# Patient Record
Sex: FEMALE | Race: BLACK OR AFRICAN AMERICAN | Employment: OTHER | URBAN - METROPOLITAN AREA
[De-identification: names, ages, dates, MRNs, and addresses within clinical notes are randomized per-mention and may not be internally consistent; named-entity substitution may affect disease eponyms.]

---

## 2017-01-30 ENCOUNTER — IP HISTORICAL/CONVERTED ENCOUNTER (OUTPATIENT)
Dept: OTHER | Age: 42
End: 2017-01-30

## 2017-02-20 ENCOUNTER — OP HISTORICAL/CONVERTED ENCOUNTER (OUTPATIENT)
Dept: OTHER | Age: 42
End: 2017-02-20

## 2017-03-07 ENCOUNTER — OP HISTORICAL/CONVERTED ENCOUNTER (OUTPATIENT)
Dept: OTHER | Age: 42
End: 2017-03-07

## 2017-03-08 ENCOUNTER — OP HISTORICAL/CONVERTED ENCOUNTER (OUTPATIENT)
Dept: OTHER | Age: 42
End: 2017-03-08

## 2017-06-14 ENCOUNTER — OP HISTORICAL/CONVERTED ENCOUNTER (OUTPATIENT)
Dept: OTHER | Age: 42
End: 2017-06-14

## 2017-07-18 ENCOUNTER — HOSPITAL ENCOUNTER (OUTPATIENT)
Dept: MRI IMAGING | Age: 42
Discharge: HOME | End: 2017-07-18
Attending: PHYSICAL MEDICINE & REHABILITATION
Payer: MEDICAID

## 2017-07-18 DIAGNOSIS — M54.50 LOW BACK PAIN: ICD-10-CM

## 2017-07-18 PROCEDURE — 72148 MRI LUMBAR SPINE W/O DYE: CPT

## 2017-08-08 ENCOUNTER — HOSPITAL ENCOUNTER (OUTPATIENT)
Dept: MRI IMAGING | Age: 42
Discharge: HOME | End: 2017-08-08
Attending: PHYSICAL MEDICINE & REHABILITATION
Payer: MEDICAID

## 2017-08-08 DIAGNOSIS — M54.6 PAIN IN THORACIC SPINE: ICD-10-CM

## 2017-08-08 PROCEDURE — 72146 MRI CHEST SPINE W/O DYE: CPT

## 2017-08-22 ENCOUNTER — OP HISTORICAL/CONVERTED ENCOUNTER (OUTPATIENT)
Dept: OTHER | Age: 42
End: 2017-08-22

## 2017-08-25 ENCOUNTER — OFFICE VISIT (OUTPATIENT)
Dept: ENDOCRINOLOGY | Age: 42
End: 2017-08-25

## 2017-08-25 VITALS
BODY MASS INDEX: 39.68 KG/M2 | WEIGHT: 293 LBS | HEART RATE: 96 BPM | SYSTOLIC BLOOD PRESSURE: 127 MMHG | TEMPERATURE: 96.3 F | HEIGHT: 72 IN | RESPIRATION RATE: 16 BRPM | OXYGEN SATURATION: 97 % | DIASTOLIC BLOOD PRESSURE: 82 MMHG

## 2017-08-25 DIAGNOSIS — I10 ESSENTIAL HYPERTENSION: ICD-10-CM

## 2017-08-25 DIAGNOSIS — E11.65 UNCONTROLLED TYPE 2 DIABETES MELLITUS WITH HYPERGLYCEMIA, WITHOUT LONG-TERM CURRENT USE OF INSULIN (HCC): Primary | ICD-10-CM

## 2017-08-25 DIAGNOSIS — E66.01 MORBID OBESITY DUE TO EXCESS CALORIES (HCC): ICD-10-CM

## 2017-08-25 DIAGNOSIS — E78.2 MIXED HYPERLIPIDEMIA: ICD-10-CM

## 2017-08-25 RX ORDER — APIXABAN 5 MG/1
5 TABLET, FILM COATED ORAL 2 TIMES DAILY
COMMUNITY
Start: 2017-08-10

## 2017-08-25 RX ORDER — KETOCONAZOLE 20 MG/ML
SHAMPOO TOPICAL AS NEEDED
COMMUNITY
Start: 2017-08-04

## 2017-08-25 RX ORDER — GLIPIZIDE 10 MG/1
TABLET ORAL
Qty: 60 TAB | Refills: 6 | Status: SHIPPED | OUTPATIENT
Start: 2017-08-25 | End: 2019-02-04 | Stop reason: ALTCHOICE

## 2017-08-25 RX ORDER — LOSARTAN POTASSIUM 50 MG/1
50 TABLET ORAL DAILY
COMMUNITY
Start: 2017-08-10

## 2017-08-25 RX ORDER — CITALOPRAM 10 MG/1
10 TABLET ORAL DAILY
COMMUNITY
Start: 2017-06-24 | End: 2019-02-04 | Stop reason: ALTCHOICE

## 2017-08-25 RX ORDER — SIMVASTATIN 20 MG/1
20 TABLET, FILM COATED ORAL
COMMUNITY
Start: 2017-06-24 | End: 2019-02-04 | Stop reason: DRUGHIGH

## 2017-08-25 RX ORDER — TRIAMCINOLONE ACETONIDE 1 MG/G
OINTMENT TOPICAL AS NEEDED
COMMUNITY
Start: 2017-08-04

## 2017-08-25 RX ORDER — GABAPENTIN 300 MG/1
300 CAPSULE ORAL 2 TIMES DAILY
COMMUNITY
Start: 2017-06-24 | End: 2022-05-09

## 2017-08-25 RX ORDER — RANITIDINE 150 MG/1
150 CAPSULE ORAL 2 TIMES DAILY
COMMUNITY
Start: 2017-08-04

## 2017-08-25 RX ORDER — LIRAGLUTIDE 6 MG/ML
1.8 INJECTION SUBCUTANEOUS DAILY
COMMUNITY
Start: 2017-08-04 | End: 2022-05-09 | Stop reason: ALTCHOICE

## 2017-08-25 NOTE — PROGRESS NOTES
Jennifer Barfield is a 46 y.o. female here for   Chief Complaint   Patient presents with    New Patient     DM       Functional glucose monitor and record keeping system? - yes  Eye exam within last year? - 3/2017  Foot exam within last year? - 8/24/17    1. Have you been to the ER, urgent care clinic since your last visit? Hospitalized since your last visit? - n/a    2. Have you seen or consulted any other health care providers outside of the [facility] since your last visit?   Include any pap smears or colon screening.-n/a      No results found for: HBA1C, HGBE8, IZM6AVEJ, [id number]    Wt Readings from Last 3 Encounters:   No data found for Wt     Temp Readings from Last 3 Encounters:   No data found for Temp     BP Readings from Last 3 Encounters:   No data found for BP     Pulse Readings from Last 3 Encounters:   No data found for Pulse

## 2017-08-25 NOTE — PATIENT INSTRUCTIONS
Do not skip meals  Do not eat in between meals    Reduce carbs- pasta, rice, potatoes, bread   Do not drink juices or sodas  Do not eat peanut butter   Do cheese     Do not eat sugar free cookies and cakes   Do not eat peaches, grapes, ornages, pineapples and  Raisins     -------------------------------------------------------------------------------------------------------------------------------------            Start on jentadueto 2.5/1000 mg twice a day with meals       Glipizide  10  Mg twice a day, twenty min before b-fast and dinner   Stop basaglar        Stay on  Victoza  At 1.8 mg a day

## 2017-08-25 NOTE — LETTER
8/26/2017 9:10 PM 
 
Patient: Lenard Keith YOB: 1975 Date of Visit: 8/25/2017 Dear Julius Davis MD 
[address] [place] [address] [place] [facility] [address] VIA Facsimile: [phone] 
 : 
 
 
Thank you for referring Ms. Lenard Keith to me for evaluation/treatment. Below are the relevant portions of my assessment and plan of care. Lenard Keith is a 46 y.o. female here for Chief Complaint Patient presents with  New Patient DM Functional glucose monitor and record keeping system? - yes Eye exam within last year? - 3/2017 Foot exam within last year? - 8/24/17 1. Have you been to the ER, urgent care clinic since your last visit? Hospitalized since your last visit? - n/a 
 
2. Have you seen or consulted any other health care providers outside of the [address] [place] [address] since your last visit? Include any pap smears or colon screening.-n/a No results found for: HBA1C, HGBE8, TGN4VAXA, GAQ4USBD Wt Readings from Last 3 Encounters:  
No data found for [employer] Temp Readings from Last 3 Encounters:  
No data found for Temp BP Readings from Last 3 Encounters:  
No data found for BP Pulse Readings from Last 3 Encounters:  
No data found for Pulse HISTORY OF PRESENT ILLNESS Lenard Keith is a 46 y.o. female. HPI Patient here for initial visit of Type 2 diabetes mellitus . Referred : by self/pcp H/o diabetes for 2 years Current A1C is 11.6 %   From August 2017 and symptoms/problems include polyuria, polydipsia and visual disturbances Current diabetic medications include basaglar and  victoza SHE HAS DVTS - on eliquis  . Current monitoring regimen: home blood tests - 2-3 times daily Home blood sugar records: trend: fluctuating a lot Any episodes of hypoglycemia? no 
 
Weight trend: increasing steadily Prior visit with dietician: no 
Current diet: \"unhealthy\" diet in general 
Current exercise: no regular exercise Known diabetic complications: nephropathy Cardiovascular risk factors: dyslipidemia, diabetes mellitus, obesity, hypertension, stress Eye exam current (within one year): yes MAYANK: yes History reviewed. No pertinent past medical history. History reviewed. No pertinent surgical history. Current Outpatient Prescriptions Medication Sig  ELIQUIS 5 mg tablet 5 mg two (2) times a day.  citalopram (CELEXA) 10 mg tablet Take 10 mg by mouth daily.  gabapentin (NEURONTIN) 300 mg capsule Take 300 mg by mouth two (2) times a day.  ketoconazole (NIZORAL) 2 % shampoo Apply  to affected area as needed.  VICTOZA 3-YOUSUF 0.6 mg/0.1 mL (18 mg/3 mL) pnij 1.8 mg by SubCUTAneous route daily.  triamcinolone acetonide (KENALOG) 0.1 % ointment Apply  to affected area as needed.  simvastatin (ZOCOR) 20 mg tablet Take 20 mg by mouth nightly.  raNITIdine hcl 150 mg capsule 150 mg two (2) times a day.  losartan (COZAAR) 50 mg tablet Take 50 mg by mouth daily. No current facility-administered medications for this visit. Review of Systems Constitutional: Negative. HENT: Negative. Eyes: Negative for pain and redness. Respiratory: Negative. Cardiovascular: Negative for chest pain, palpitations and leg swelling. Gastrointestinal: Negative. Negative for constipation. Genitourinary: Negative. Musculoskeletal: Negative for myalgias. Skin: Negative. Neurological: Negative. Endo/Heme/Allergies: Negative. Psychiatric/Behavioral: Negative for depression and memory loss. The patient does not have insomnia. Physical Exam  
Constitutional: She is oriented to person, place, and time. She appears well-developed and well-nourished. Body mass index is 55.04 kg/(m^2). HENT:  
Head: Normocephalic. Eyes: Conjunctivae and EOM are normal. Pupils are equal, round, and reactive to light. Neck: Normal range of motion. Neck supple. No JVD present. No tracheal deviation present.  No thyromegaly present. Cardiovascular: Normal rate, regular rhythm and normal heart sounds. No murmur heard. Pulmonary/Chest: Breath sounds normal.  
Abdominal: Soft. Bowel sounds are normal.  
Musculoskeletal: Normal range of motion. Lymphadenopathy:  
  She has no cervical adenopathy. Neurological: She is alert and oriented to person, place, and time. She has normal reflexes. Skin: Skin is warm. Psychiatric: She has a normal mood and affect. Reviewed from PCP  
 
 
ASSESSMENT and PLAN 1. Type 2 DM, poorly controlled :  a1c is over 11 % Discussed DM 2 patho-physiology extensively Gave her dietary instructions Start on jentadueto 2.5/1000 mg twice a day with meals Glipizide  10  Mg twice a day, twenty min before b-fast and dinner Stop basaglar Stay on  Victoza  At 1.8 mg a day Patient is advised about checking blood sugars 4 times a day and maintaining log book. The danger of having low blood sugars has been explained with inappropriate use of insulin  Patient voiced understanding and using the printed instructions at home. Hypoglycemia management has been explained to the patient. lab results and schedule of future lab studies reviewed with patient 
specific diabetic recommendations: diabetic diet discussed in detail, written exchange diet given, home glucose monitoring emphasized, home glucose monitoring demonstrated and taught, glucose meter dispensed to patient, all medications, side effects and compliance discussed carefully and use and side effects of insulin is taught 2. Hypoglycemia :  Educated on treating the hypoglycemia. Discussed Glucagon use and prescribed 3. HTN : continue cozaar. Patient is educated about importance of compliance with anti-hypertensives especially ARB/ACEI 4. Dyslipidemia : continue zocorPatient is educated about benefits and adverse effects of statins and explained how benefits outweigh risk.  
 
5. use of aspirin to prevent MI and TIA's discussed 6. Holds a CANE for back pain, ruptured disc, on neurontin 7. Morbidly obese ; Body mass index is 55.04 kg/(m^2). On victoza Diet emphasized 8. On anticoagulants , Elliquis - for multiple DVTs  
 
> 50 % of time is spent on counseling If you have questions, please do not hesitate to call me. I look forward to following Ms. Laura Hair along with you. Sincerely, Claudene Maffucci, MD

## 2017-08-25 NOTE — PROGRESS NOTES
HISTORY OF PRESENT ILLNESS  Sury Hoyt is a 46 y.o. female. HPI  Patient here for initial visit of Type 2 diabetes mellitus . Referred : by self/pcp    H/o diabetes for 2 years     Current A1C is 11.6 %   From August 2017 and symptoms/problems include polyuria, polydipsia and visual disturbances     Current diabetic medications include basaglar and  victoza     SHE HAS DVTS - on eliquis  . Current monitoring regimen: home blood tests - 2-3 times daily  Home blood sugar records: trend: fluctuating a lot  Any episodes of hypoglycemia? no    Weight trend: increasing steadily  Prior visit with dietician: no  Current diet: \"unhealthy\" diet in general  Current exercise: no regular exercise    Known diabetic complications: nephropathy  Cardiovascular risk factors: dyslipidemia, diabetes mellitus, obesity, hypertension, stress    Eye exam current (within one year): yes  MAYANK: yes     History reviewed. No pertinent past medical history. History reviewed. No pertinent surgical history. Current Outpatient Prescriptions   Medication Sig    ELIQUIS 5 mg tablet 5 mg two (2) times a day.  citalopram (CELEXA) 10 mg tablet Take 10 mg by mouth daily.  gabapentin (NEURONTIN) 300 mg capsule Take 300 mg by mouth two (2) times a day.  ketoconazole (NIZORAL) 2 % shampoo Apply  to affected area as needed.  VICTOZA 3-YOUSUF 0.6 mg/0.1 mL (18 mg/3 mL) pnij 1.8 mg by SubCUTAneous route daily.  triamcinolone acetonide (KENALOG) 0.1 % ointment Apply  to affected area as needed.  simvastatin (ZOCOR) 20 mg tablet Take 20 mg by mouth nightly.  raNITIdine hcl 150 mg capsule 150 mg two (2) times a day.  losartan (COZAAR) 50 mg tablet Take 50 mg by mouth daily. No current facility-administered medications for this visit. Review of Systems   Constitutional: Negative. HENT: Negative. Eyes: Negative for pain and redness. Respiratory: Negative.     Cardiovascular: Negative for chest pain, palpitations and leg swelling. Gastrointestinal: Negative. Negative for constipation. Genitourinary: Negative. Musculoskeletal: Negative for myalgias. Skin: Negative. Neurological: Negative. Endo/Heme/Allergies: Negative. Psychiatric/Behavioral: Negative for depression and memory loss. The patient does not have insomnia. Physical Exam   Constitutional: She is oriented to person, place, and time. She appears well-developed and well-nourished. Body mass index is 55.04 kg/(m^2). HENT:   Head: Normocephalic. Eyes: Conjunctivae and EOM are normal. Pupils are equal, round, and reactive to light. Neck: Normal range of motion. Neck supple. No JVD present. No tracheal deviation present. No thyromegaly present. Cardiovascular: Normal rate, regular rhythm and normal heart sounds. No murmur heard. Pulmonary/Chest: Breath sounds normal.   Abdominal: Soft. Bowel sounds are normal.   Musculoskeletal: Normal range of motion. Lymphadenopathy:     She has no cervical adenopathy. Neurological: She is alert and oriented to person, place, and time. She has normal reflexes. Skin: Skin is warm. Psychiatric: She has a normal mood and affect. Reviewed from PCP       ASSESSMENT and PLAN    1. Type 2 DM, poorly controlled :  a1c is over 11 %     Discussed DM 2 patho-physiology extensively   Gave her dietary instructions    Start on jentadueto 2.5/1000 mg twice a day with meals   Glipizide  10  Mg twice a day, twenty min before b-fast and dinner   Stop basaglar      Stay on  Victoza  At 1.8 mg a day     Patient is advised about checking blood sugars 4 times a day and maintaining log book. The danger of having low blood sugars has been explained with inappropriate use of insulin  Patient voiced understanding and using the printed instructions at home. Hypoglycemia management has been explained to the patient.          lab results and schedule of future lab studies reviewed with patient  specific diabetic recommendations: diabetic diet discussed in detail, written exchange diet given, home glucose monitoring emphasized, home glucose monitoring demonstrated and taught, glucose meter dispensed to patient, all medications, side effects and compliance discussed carefully and use and side effects of insulin is taught    2. Hypoglycemia :  Educated on treating the hypoglycemia. Discussed Glucagon use and prescribed    3. HTN : continue cozaar. Patient is educated about importance of compliance with anti-hypertensives especially ARB/ACEI    4. Dyslipidemia : continue zocorPatient is educated about benefits and adverse effects of statins and explained how benefits outweigh risk. 5. use of aspirin to prevent MI and TIA's discussed      6. Holds a CANE for back pain, ruptured disc, on neurontin    7. Morbidly obese ; Body mass index is 55.04 kg/(m^2). On victoza  Diet emphasized    8.  On anticoagulants , Elliquis - for multiple DVTs     > 50 % of time is spent on counseling

## 2017-08-26 PROBLEM — E78.2 MIXED HYPERLIPIDEMIA: Status: ACTIVE | Noted: 2017-08-26

## 2017-08-26 PROBLEM — E66.01 MORBID OBESITY DUE TO EXCESS CALORIES (HCC): Status: ACTIVE | Noted: 2017-08-26

## 2017-08-26 PROBLEM — I10 ESSENTIAL HYPERTENSION: Status: ACTIVE | Noted: 2017-08-26

## 2017-11-03 ENCOUNTER — OP HISTORICAL/CONVERTED ENCOUNTER (OUTPATIENT)
Dept: OTHER | Age: 42
End: 2017-11-03

## 2018-02-15 ENCOUNTER — OP HISTORICAL/CONVERTED ENCOUNTER (OUTPATIENT)
Dept: OTHER | Age: 43
End: 2018-02-15

## 2018-03-13 ENCOUNTER — OP HISTORICAL/CONVERTED ENCOUNTER (OUTPATIENT)
Dept: OTHER | Age: 43
End: 2018-03-13

## 2018-05-09 ENCOUNTER — OP HISTORICAL/CONVERTED ENCOUNTER (OUTPATIENT)
Dept: OTHER | Age: 43
End: 2018-05-09

## 2018-05-23 ENCOUNTER — OP HISTORICAL/CONVERTED ENCOUNTER (OUTPATIENT)
Dept: OTHER | Age: 43
End: 2018-05-23

## 2018-11-12 ENCOUNTER — ED HISTORICAL/CONVERTED ENCOUNTER (OUTPATIENT)
Dept: OTHER | Age: 43
End: 2018-11-12

## 2019-01-21 LAB — HBA1C MFR BLD HPLC: 11.2 %

## 2019-01-22 LAB
CREATININE, EXTERNAL: 0.63
LDL-C, EXTERNAL: 147
MICROALBUMIN UR TEST STR-MCNC: 533.7 MG/DL

## 2019-01-31 ENCOUNTER — OP HISTORICAL/CONVERTED ENCOUNTER (OUTPATIENT)
Dept: OTHER | Age: 44
End: 2019-01-31

## 2019-02-04 ENCOUNTER — OFFICE VISIT (OUTPATIENT)
Dept: ENDOCRINOLOGY | Age: 44
End: 2019-02-04

## 2019-02-04 VITALS
HEART RATE: 91 BPM | HEIGHT: 72 IN | DIASTOLIC BLOOD PRESSURE: 69 MMHG | RESPIRATION RATE: 18 BRPM | SYSTOLIC BLOOD PRESSURE: 119 MMHG | OXYGEN SATURATION: 98 % | WEIGHT: 293 LBS | BODY MASS INDEX: 39.68 KG/M2 | TEMPERATURE: 96.6 F

## 2019-02-04 DIAGNOSIS — E11.65 UNCONTROLLED TYPE 2 DIABETES MELLITUS WITH HYPERGLYCEMIA, WITHOUT LONG-TERM CURRENT USE OF INSULIN (HCC): Primary | ICD-10-CM

## 2019-02-04 DIAGNOSIS — E66.01 CLASS 3 SEVERE OBESITY DUE TO EXCESS CALORIES WITH SERIOUS COMORBIDITY AND BODY MASS INDEX (BMI) OF 50.0 TO 59.9 IN ADULT (HCC): ICD-10-CM

## 2019-02-04 DIAGNOSIS — F32.A DEPRESSION, UNSPECIFIED DEPRESSION TYPE: ICD-10-CM

## 2019-02-04 DIAGNOSIS — E78.2 MIXED HYPERLIPIDEMIA: ICD-10-CM

## 2019-02-04 DIAGNOSIS — I10 ESSENTIAL HYPERTENSION: ICD-10-CM

## 2019-02-04 DIAGNOSIS — E66.01 MORBID OBESITY DUE TO EXCESS CALORIES (HCC): ICD-10-CM

## 2019-02-04 DIAGNOSIS — R80.1 PERSISTENT PROTEINURIA: ICD-10-CM

## 2019-02-04 RX ORDER — INSULIN ASPART 100 [IU]/ML
INJECTION, SOLUTION INTRAVENOUS; SUBCUTANEOUS
Qty: 30 ML | Refills: 6 | Status: SHIPPED | OUTPATIENT
Start: 2019-02-04 | End: 2019-12-18

## 2019-02-04 RX ORDER — INSULIN GLARGINE 100 [IU]/ML
INJECTION, SOLUTION SUBCUTANEOUS
COMMUNITY
Start: 2019-01-30 | End: 2019-02-04 | Stop reason: SDUPTHER

## 2019-02-04 RX ORDER — PANTOPRAZOLE SODIUM 40 MG/1
TABLET, DELAYED RELEASE ORAL
COMMUNITY
Start: 2019-01-29

## 2019-02-04 RX ORDER — FLUOXETINE HYDROCHLORIDE 20 MG/1
20 CAPSULE ORAL DAILY
Qty: 30 CAP | Refills: 3 | Status: SHIPPED | OUTPATIENT
Start: 2019-02-04 | End: 2022-02-25

## 2019-02-04 RX ORDER — INSULIN GLARGINE 100 [IU]/ML
INJECTION, SOLUTION SUBCUTANEOUS
Qty: 15 ML | Refills: 6 | Status: SHIPPED | OUTPATIENT
Start: 2019-02-04 | End: 2019-04-19 | Stop reason: SDUPTHER

## 2019-02-04 RX ORDER — FUROSEMIDE 40 MG/1
TABLET ORAL
COMMUNITY
Start: 2019-01-18 | End: 2022-02-25

## 2019-02-04 RX ORDER — METFORMIN HYDROCHLORIDE 500 MG/1
TABLET, EXTENDED RELEASE ORAL
Qty: 60 TAB | Refills: 6 | Status: SHIPPED | OUTPATIENT
Start: 2019-02-04

## 2019-02-04 NOTE — PROGRESS NOTES
HISTORY OF PRESENT ILLNESS Mitch Moctezuma is a 41 y.o. female. Patient here for  FIRST FOLLOW UP AFTER  initial visit of Type 2 diabetes mellitus   From august 2017 SAW HER ONLY ONE TIME  
AND LOST FOR FOLLOW UP  
 
 
SHE SAYS PCP WAS MANAGING IT  
 
SHE GOT THE METER ( NOT DATED AND TIMED ) SHE SAYS SHE IS EATING SUGARS  - ONLY NOW the sugars are higher per her SHE IN THE INTERIM   BACK   ONTO  INSULIN AGAIN      
SHE WAS CHANGED FROM BASAGLAR TO LANTUS  
 
 
 
 
OLD HISTORY : 
 
 
Referred : by self/pcp H/o diabetes for 2 years Current A1C is 11.6 %   From August 2017 and symptoms/problems include polyuria, polydipsia and visual disturbances Current diabetic medications include basaglar and  victoza SHE HAS DVTS - on eliquis  . Current monitoring regimen: home blood tests - 2-3 times daily Home blood sugar records: trend: fluctuating a lot Any episodes of hypoglycemia? no 
 
Weight trend: increasing steadily Prior visit with dietician: no 
Current diet: \"unhealthy\" diet in general 
Current exercise: no regular exercise Known diabetic complications: nephropathy Cardiovascular risk factors: dyslipidemia, diabetes mellitus, obesity, hypertension, stress Eye exam current (within one year): yes MAYANK: yes Past Medical History:  
Diagnosis Date  Anemia  Anxiety  Benign essential hypertension  Carcinoma of endometrium ([address]  Costochondritis  Degeneration of intervertebral disc of lumbar region  Depression  DJD (degenerative joint disease) of knee  DVT, lower extremity, distal, acute ([address]  Fibroid  GERD (gastroesophageal reflux disease)  Hypercholesteremia  Obstructive sleep apnea  Pulmonary embolism, bilateral ([address]  Type 2 diabetes mellitus ([address]  Vitamin D deficiency Past Surgical History:  
Procedure Laterality Date  HX CHOLECYSTECTOMY  HX TOTAL ABDOMINAL HYSTERECTOMY  04/01/2014  Dr. Theresa Bates  
 Current Outpatient Medications Medication Sig  
 LANTUS SOLOSTAR U-100 INSULIN 100 unit/mL (3 mL) inpn  pantoprazole (PROTONIX) 40 mg tablet  furosemide (LASIX) 40 mg tablet  ELIQUIS 5 mg tablet 5 mg two (2) times a day.  gabapentin (NEURONTIN) 300 mg capsule Take 300 mg by mouth two (2) times a day.  ketoconazole (NIZORAL) 2 % shampoo Apply  to affected area as needed.  VICTOZA 3-YOUSUF 0.6 mg/0.1 mL (18 mg/3 mL) pnij 1.8 mg by SubCUTAneous route daily.  triamcinolone acetonide (KENALOG) 0.1 % ointment Apply  to affected area as needed.  simvastatin (ZOCOR) 20 mg tablet Take 20 mg by mouth nightly.  losartan (COZAAR) 50 mg tablet Take 50 mg by mouth daily.  citalopram (CELEXA) 10 mg tablet Take 10 mg by mouth daily.  raNITIdine hcl 150 mg capsule 150 mg two (2) times a day.  linagliptin-metFORMIN (JENTADUETO) 2.5-1,000 mg per tablet Take 1 Tab by mouth two (2) times daily (with meals).  glipiZIDE (GLUCOTROL) 10 mg tablet Take 1 tab 20 minutes before Breakfast and 1 tab 20 minutes before [employer] No current facility-administered medications for this visit. Review of Systems Constitutional: Negative. HENT: Negative. Eyes: Negative for pain and redness. Respiratory: Negative. Cardiovascular: Negative for chest pain, palpitations and leg swelling. Gastrointestinal: Negative. Negative for constipation. Genitourinary: Negative. Musculoskeletal: Negative for myalgias. Skin: Negative. Neurological: Negative. Endo/Heme/Allergies: Negative. Psychiatric/Behavioral: Negative for depression and memory loss. The patient does not have insomnia. Physical Exam  
Constitutional: She is oriented to person, place, and time. She appears well-developed and well-nourished. Body mass index is 55.04 kg/(m^2). HENT:  
Head: Normocephalic. Eyes: Conjunctivae and EOM are normal. Pupils are equal, round, and reactive to light.   
Neck: Normal range of motion. Neck supple. No JVD present. No tracheal deviation present. No thyromegaly present. Cardiovascular: Normal rate, regular rhythm and normal heart sounds. No murmur heard. Pulmonary/Chest: Breath sounds normal.  
Abdominal: Soft. Bowel sounds are normal.  
Musculoskeletal: Normal range of motion. Lymphadenopathy:  
  She has no cervical adenopathy. Neurological: She is alert and oriented to person, place, and time. She has normal reflexes. Skin: Skin is warm. Psychiatric: She has a normal mood and affect. Diabetic foot exam:  
 
Left Foot: 
 Visual Exam: normal  
 Pulse DP: 2+ (normal) Filament test: normal sensation Right Foot: 
 Visual Exam: normal  
 Pulse DP: 2+ (normal) Filament test: normal sensation Reviewed from PCP   DATED JAN 2019 ASSESSMENT and PLAN 1. Type 2 DM, poorly controlled :  a1c is   STILL  11.2 %       FROM      JAN 2019      COMPARED TO  over 11 %   FROM AUGUST 2017 Has hyperglycemic symptoms NO FOLLOW UP AFTER THE INITIAL VISIT Discussed DM 2 patho-physiology extensively Gave her dietary instructions  Again Off  jentadueto 2.5/1000 mg ALSO  
SHE CANNOT TOLERATE PLAIN METFORMIN Asking her to try metformin er StopPED  basaglar  WITH THE HOPE THAT SHE WILL FOLLOW DIET PROPERLY  At last visit and got her onto glipizide SHE NEVER FOLLOWED UP AND SHE GOT BACK ONTO BASAL INSULIN AT VERY LOW DOSE  
 SHE IS NO LONGER ON GLIPIZIDE 10  Mg , GOT DISCONTINUED IN THE INTERIM  
 
increased the basal insulin Stay on  Victoza  At 1.8 mg a day WILL DO MEAL TIME INSULINS Explained the sliding scale regimen Patient is advised about checking blood sugars 4 times a day and maintaining log book. The danger of having low blood sugars has been explained with inappropriate use of insulin  Patient voiced understanding and using the printed instructions at home.  Hypoglycemia management has been explained to the patient. lab results and schedule of future lab studies reviewed with patient 
specific diabetic recommendations: diabetic diet discussed in detail, written exchange diet given, home glucose monitoring emphasized, home glucose monitoring demonstrated and taught, glucose meter dispensed to patient, all medications, side effects and compliance discussed carefully and use and side effects of insulin is taught 2. Hypoglycemia :  Educated on treating the hypoglycemia. Discussed Glucagon use and prescribed 3. HTN : continue cozaar. Patient is educated about importance of compliance with anti-hypertensives especially ARB/ACEI 4. Dyslipidemia :  ldl     PCP INCREASED TO  zocor 40 MG HS Await new set of labs Patient is educated about benefits and adverse effects of statins and explained how benefits outweigh risk. 5. use of aspirin to prevent MI and TIA's discussed 6. Holds a CANE for back pain, ruptured disc, on neurontin 7. Morbidly obese ; Body mass index is 55.58 kg/m². On victoza Diet emphasized 8. On anticoagulants , Elliquis - for multiple DVTs  
 
9  prOTEINURIA  : 533  FROM JAN 2019 DISCUSSED PROGNOSIS OF IT 
 
 
10 . DEPRESSION  :  START ON FLUOXETINE   
 
 
> 50 % of time is spent on counseling about her self management plans Patient voiced understanding her plan of care

## 2019-02-04 NOTE — PATIENT INSTRUCTIONS
Do not skip meals Do not eat in between meals Reduce carbs- pasta, rice, potatoes, bread Do not drink juices or sodas Do not eat peanut butter Do cheese Do not eat sugar free cookies and cakes Do not eat peaches, grapes, ornages, pineapples and  Raisins  
 
------------------------------------------------------------------------------------------------------------------------------------- 
 
START ON FLUOXETINE  
 
 
 
START ON METFORMIN ER 50O MG  TWICE A DAY WITH MEALS  
 
STAY ON VICTOZA AT 1.8 MG A DAY Check blood sugars immediately before each meal and at bedtime INCREASE  LANTUS  50 UNITS AT NIGHT  
 
START ON NOVOLOG insulin  10 UNITS WITH EACH MEAL Also, add additional nOVOLOG  as follows with meals  If blood sugars are[de-identified] 
 
150-200 mg 2 units 201-250 mg 5 units 251-300 mg 8 units 301-350 mg 11 units 351-400 mg 14 units 401-450 mg 17 units 451-500 mg 20 units Less than 70 mg NO INSULIN

## 2019-02-04 NOTE — LETTER
NOTIFICATION RETURN TO WORK / SCHOOL 
 
2/4/2019 11:36 AM 
 
Ms. Jennifer Barfield [address] [facility] [address] To Whom It May Concern: Jennifer Barfield is currently under the care of [address] [address] Her daughter, Michelle Gomez accompanied her to this visit She will return to work/school on: 02/05/2019. If there are questions or concerns please have the patient contact our office. Sincerely, Melissa Vidal MD

## 2019-02-04 NOTE — PROGRESS NOTES
1. Have you been to the ER, urgent care clinic since your last visit? Hospitalized since your last visit? No 
 
2. Have you seen or consulted any other health care providers outside of the [address] [place] [address] since your last visit? Include any pap smears or colon screening. No  
 
Chief Complaint Patient presents with  Diabetes  
  followup Learning assessment complete Abuse Screening Questionnaire 2/4/2019 Do you ever feel afraid of your partner? Malgorzata Ryder Are you in a relationship with someone who physically or mentally threatens you? Malgorzata Ryder Is it safe for you to go home? Davida Dance Wt Readings from Last 3 Encounters:  
02/04/19 (!) 409 lb 12.8 oz (185.9 kg) 08/25/17 (!) 405 lb 12.8 oz (184.1 kg) Temp Readings from Last 3 Encounters:  
02/04/19 96.6 °F (35.9 °C) (Oral) 08/25/17 96.3 °F (35.7 °C) (Oral) BP Readings from Last 3 Encounters:  
02/04/19 119/69  
08/25/17 127/82 Pulse Readings from Last 3 Encounters:  
02/04/19 91  
08/25/17 96

## 2019-04-19 RX ORDER — INSULIN GLARGINE 100 [IU]/ML
INJECTION, SOLUTION SUBCUTANEOUS
Qty: 15 ML | Refills: 6 | Status: SHIPPED | OUTPATIENT
Start: 2019-04-19 | End: 2020-03-05

## 2019-04-19 NOTE — TELEPHONE ENCOUNTER
Patient says Dr. Angelito Luo increased Lantus from 16 units to 50 units. Patient says new prescription was never called in and now she is out. Please send new prescription to [facility].

## 2020-01-12 RX ORDER — INSULIN ASPART 100 [IU]/ML
INJECTION, SOLUTION INTRAVENOUS; SUBCUTANEOUS
Qty: 30 ML | Refills: 0 | Status: SHIPPED | OUTPATIENT
Start: 2020-01-12 | End: 2022-05-09 | Stop reason: SDUPTHER

## 2020-02-05 ENCOUNTER — OP HISTORICAL/CONVERTED ENCOUNTER (OUTPATIENT)
Dept: OTHER | Age: 45
End: 2020-02-05

## 2020-02-14 ENCOUNTER — OP HISTORICAL/CONVERTED ENCOUNTER (OUTPATIENT)
Dept: OTHER | Age: 45
End: 2020-02-14

## 2020-02-28 ENCOUNTER — OP HISTORICAL/CONVERTED ENCOUNTER (OUTPATIENT)
Dept: OTHER | Age: 45
End: 2020-02-28

## 2020-02-28 LAB — MAMMOGRAPHY, EXTERNAL: NORMAL

## 2020-03-05 ENCOUNTER — OP HISTORICAL/CONVERTED ENCOUNTER (OUTPATIENT)
Dept: OTHER | Age: 45
End: 2020-03-05

## 2020-11-02 ENCOUNTER — TRANSCRIBE ORDER (OUTPATIENT)
Dept: SCHEDULING | Age: 45
End: 2020-11-02

## 2020-11-02 DIAGNOSIS — R51.9 HEAD ACHE: Primary | ICD-10-CM

## 2020-11-03 ENCOUNTER — TRANSCRIBE ORDER (OUTPATIENT)
Dept: SCHEDULING | Age: 45
End: 2020-11-03

## 2020-11-03 DIAGNOSIS — R51.9 HEAD ACHE: Primary | ICD-10-CM

## 2020-11-03 DIAGNOSIS — M54.2 NECK PAIN: ICD-10-CM

## 2020-11-04 ENCOUNTER — TRANSCRIBE ORDER (OUTPATIENT)
Dept: SCHEDULING | Age: 45
End: 2020-11-04

## 2020-11-04 DIAGNOSIS — M54.2 PAIN, NECK: Primary | ICD-10-CM

## 2020-11-05 ENCOUNTER — TRANSCRIBE ORDER (OUTPATIENT)
Dept: SCHEDULING | Age: 45
End: 2020-11-05

## 2020-11-05 DIAGNOSIS — M54.2 NECK PAIN, ACUTE: Primary | ICD-10-CM

## 2020-11-10 ENCOUNTER — HOSPITAL ENCOUNTER (OUTPATIENT)
Dept: CT IMAGING | Age: 45
Discharge: HOME | End: 2020-11-10
Attending: INTERNAL MEDICINE
Payer: MEDICARE

## 2020-11-10 DIAGNOSIS — R51.9 HEAD ACHE: ICD-10-CM

## 2020-11-10 DIAGNOSIS — M54.2 NECK PAIN, ACUTE: ICD-10-CM

## 2020-11-10 PROCEDURE — 72125 CT NECK SPINE W/O DYE: CPT

## 2020-11-10 PROCEDURE — 70450 CT HEAD/BRAIN W/O DYE: CPT

## 2021-01-05 ENCOUNTER — TRANSCRIBE ORDER (OUTPATIENT)
Dept: SCHEDULING | Age: 46
End: 2021-01-05

## 2021-01-05 DIAGNOSIS — M54.2 CERVICALGIA: Primary | ICD-10-CM

## 2021-01-20 ENCOUNTER — HOSPITAL ENCOUNTER (OUTPATIENT)
Dept: MRI IMAGING | Age: 46
Discharge: HOME | End: 2021-01-20
Attending: PHYSICAL MEDICINE & REHABILITATION

## 2021-01-20 DIAGNOSIS — M54.2 CERVICALGIA: ICD-10-CM

## 2021-01-20 NOTE — ROUTINE PROCESS
Patient is severely claustrophobic and unable to attempt open MRI cervical spine. Advised patient to speak with physician for further instruction.

## 2021-01-25 ENCOUNTER — TRANSCRIBE ORDER (OUTPATIENT)
Dept: SCHEDULING | Age: 46
End: 2021-01-25

## 2021-01-25 DIAGNOSIS — M54.2 CERVICALGIA: Primary | ICD-10-CM

## 2021-02-02 ENCOUNTER — TRANSCRIBE ORDER (OUTPATIENT)
Dept: SCHEDULING | Age: 46
End: 2021-02-02

## 2021-02-02 DIAGNOSIS — M54.2 CERVICALGIA: Primary | ICD-10-CM

## 2021-02-26 ENCOUNTER — TRANSCRIBE ORDER (OUTPATIENT)
Dept: SCHEDULING | Age: 46
End: 2021-02-26

## 2021-02-26 DIAGNOSIS — Z12.31 VISIT FOR SCREENING MAMMOGRAM: Primary | ICD-10-CM

## 2021-03-01 ENCOUNTER — TRANSCRIBE ORDER (OUTPATIENT)
Dept: SCHEDULING | Age: 46
End: 2021-03-01

## 2021-03-01 DIAGNOSIS — M54.2 CERVICALGIA: Primary | ICD-10-CM

## 2021-03-11 ENCOUNTER — HOSPITAL ENCOUNTER (OUTPATIENT)
Dept: MRI IMAGING | Age: 46
Discharge: HOME | End: 2021-03-11
Attending: PHYSICAL MEDICINE & REHABILITATION
Payer: MEDICARE

## 2021-03-11 DIAGNOSIS — M54.2 CERVICALGIA: ICD-10-CM

## 2021-03-11 PROCEDURE — 72141 MRI NECK SPINE W/O DYE: CPT

## 2021-10-19 ENCOUNTER — TRANSCRIBE ORDER (OUTPATIENT)
Dept: SCHEDULING | Age: 46
End: 2021-10-19

## 2021-10-19 DIAGNOSIS — Z12.31 VISIT FOR SCREENING MAMMOGRAM: Primary | ICD-10-CM

## 2021-11-15 ENCOUNTER — HOSPITAL ENCOUNTER (OUTPATIENT)
Dept: MAMMOGRAPHY | Age: 46
Discharge: HOME | End: 2021-11-15
Attending: INTERNAL MEDICINE
Payer: MEDICARE

## 2021-11-15 DIAGNOSIS — Z12.31 VISIT FOR SCREENING MAMMOGRAM: ICD-10-CM

## 2021-11-15 PROCEDURE — 77063 BREAST TOMOSYNTHESIS BI: CPT

## 2022-01-24 LAB
CREATININE, EXTERNAL: 0.72
HBA1C MFR BLD HPLC: 12.1 %
LDL-C, EXTERNAL: 223
TOTAL CHOLESTEROL, NCHOLT: 333

## 2022-02-25 ENCOUNTER — OFFICE VISIT (OUTPATIENT)
Dept: ENT CLINIC | Age: 47
End: 2022-02-25
Payer: MEDICARE

## 2022-02-25 VITALS
WEIGHT: 293 LBS | BODY MASS INDEX: 43.4 KG/M2 | SYSTOLIC BLOOD PRESSURE: 130 MMHG | TEMPERATURE: 97.7 F | OXYGEN SATURATION: 98 % | HEART RATE: 68 BPM | HEIGHT: 69 IN | RESPIRATION RATE: 16 BRPM | DIASTOLIC BLOOD PRESSURE: 86 MMHG

## 2022-02-25 DIAGNOSIS — H93.8X3 CLOGGED EAR, BILATERAL: Primary | ICD-10-CM

## 2022-02-25 DIAGNOSIS — J31.0 RHINITIS, UNSPECIFIED TYPE: ICD-10-CM

## 2022-02-25 DIAGNOSIS — H93.13 BILATERAL TINNITUS: ICD-10-CM

## 2022-02-25 PROCEDURE — G8754 DIAS BP LESS 90: HCPCS | Performed by: NURSE PRACTITIONER

## 2022-02-25 PROCEDURE — G8752 SYS BP LESS 140: HCPCS | Performed by: NURSE PRACTITIONER

## 2022-02-25 PROCEDURE — G8419 CALC BMI OUT NRM PARAM NOF/U: HCPCS | Performed by: NURSE PRACTITIONER

## 2022-02-25 PROCEDURE — 99204 OFFICE O/P NEW MOD 45 MIN: CPT | Performed by: NURSE PRACTITIONER

## 2022-02-25 PROCEDURE — G8427 DOCREV CUR MEDS BY ELIG CLIN: HCPCS | Performed by: NURSE PRACTITIONER

## 2022-02-25 PROCEDURE — G8432 DEP SCR NOT DOC, RNG: HCPCS | Performed by: NURSE PRACTITIONER

## 2022-02-25 RX ORDER — FLUTICASONE PROPIONATE 50 MCG
SPRAY, SUSPENSION (ML) NASAL
Qty: 1 EACH | Refills: 3 | Status: SHIPPED | OUTPATIENT
Start: 2022-02-25

## 2022-02-25 RX ORDER — MOMETASONE FUROATE 1 MG/G
OINTMENT TOPICAL DAILY
Qty: 15 G | Refills: 0 | Status: SHIPPED | OUTPATIENT
Start: 2022-02-25

## 2022-02-25 NOTE — PROGRESS NOTES
Visit Vitals  /86 (BP 1 Location: Left upper arm, BP Patient Position: Sitting, BP Cuff Size: Large adult)   Pulse 68   Temp 97.7 °F (36.5 °C) (Temporal)   Resp 16   Ht 5' 9\" (1.753 m)   Wt (!) 409 lb (185.5 kg)   SpO2 98%   BMI 60.40 kg/m²     Chief Complaint   Patient presents with    New Patient     clean ears

## 2022-02-25 NOTE — PROGRESS NOTES
Otolaryngology-Head and Neck Surgery  New Patient Visit     Patient: Konstantin Blanc  YOB: 1975  MRN: [id number]  Date of Service: 2/25/2022    Chief Complaint: Bilateral ears clogged    History of Present Illness: Konstantin Blanc is a 42y.o. year old female who presents today for discussion of      Reports bilateral clogged for 2 months  Worse in left  Denies otalgia, congestion, rhinorrhea, PND  +otorrhea, tinnitus, dizziness, itching   Describes as intermittent buzz  Has used triamcinolone/ketoconazole ointment but does not help    Last hearing test, 4 years ago; normal at Dr. Teresa Castillo  No family HX of hearing loss    Hx ENT surgical HX    Past Medical History:  Past Medical History:   Diagnosis Date    Anemia     Anxiety     Benign essential hypertension     Carcinoma of endometrium ([address]     Costochondritis     Degeneration of intervertebral disc of lumbar region     Depression     DJD (degenerative joint disease) of knee     DVT, lower extremity, distal, acute ([address]     Fibroid     GERD (gastroesophageal reflux disease)     Hypercholesteremia     Obstructive sleep apnea     Pulmonary embolism, bilateral ([address]     Type 2 diabetes mellitus ([address]     Vitamin D deficiency        Past Surgical History:   Past Surgical History:   Procedure Laterality Date    HX CHOLECYSTECTOMY      HX TOTAL ABDOMINAL HYSTERECTOMY  04/01/2014    Dr. MCGOWAN [facility]       Medications:   Current Outpatient Medications   Medication Instructions    Eliquis 5 mg, 2 TIMES DAILY    FLUoxetine (PROZAC) 20 mg, Oral, DAILY    furosemide (LASIX) 40 mg tablet No dose, route, or frequency recorded.     gabapentin (NEURONTIN) 300 mg, Oral, 2 TIMES DAILY    insulin aspart U-100 (NOVOLOG FLEXPEN U-100 INSULIN) 100 unit/mL (3 mL) inpn INJECT 10 UNITS WITH EACH MEAL PLUS SLIDING SCALE TO MAX OF 90 UNITS DAILY    insulin glargine (LANTUS SOLOSTAR U-100 INSULIN) 100 unit/mL (3 mL) inpn INJECT 50 UNITS SUBCUTANEOUSLY AT NIGHT    ketoconazole (NIZORAL) 2 % shampoo Topical, AS NEEDED    losartan (COZAAR) 50 mg, Oral, DAILY    metFORMIN ER (GLUCOPHAGE XR) 500 mg tablet Take one tab twice daily with meals.  pantoprazole (PROTONIX) 40 mg tablet No dose, route, or frequency recorded.  raNITIdine hcl 150 mg, 2 TIMES DAILY    triamcinolone acetonide (KENALOG) 0.1 % ointment Topical, AS NEEDED    Victoza 3-Agustin 1.8 mg, SubCUTAneous, DAILY       Allergies:   No Known Allergies    Social History:   Social History     Tobacco Use    Smoking status: Former Smoker     Packs/day: 0.50     Quit date: 2017     Years since quittin.0    Smokeless tobacco: Never Used   Substance Use Topics    Alcohol use: No    Drug use: Not on file        Family History:  Family History   Problem Relation Age of Onset    Hypertension Maternal Grandmother     Cancer Maternal Grandmother         breast    Cancer Maternal Grandfather         throat    Diabetes Maternal Grandfather        Review of Systems:    Consitutional: denies fever, excessive weight gain or loss. Eyes: denies diplopia, eye pain. Integumentary: denies new concerning skin lesions. Ears, Nose, Mouth, Throat: denies except as per HPI.   Endocrine: denies hot or cold intolerance, increased thirst.  Respiratory: denies cough, hemoptysis, wheezing  Gastrointestinal: denies trouble swallowing, nausea, emesis, regurgitation  Musculoskeletal: denies muscle weakness or wasting  Cardiovascular: denies chest pain, shortness of breath  Neurologic: denies seizures, numbness or tingling, syncope  Hematologic: denies easy bleeding or bruising    Physical Examination:   Vitals:    22 1125   BP: 130/86   BP 1 Location: Left upper arm   BP Patient Position: Sitting   BP Cuff Size: Large adult   Pulse: 68   Temp: 97.7 °F (36.5 °C)   TempSrc: Temporal   Resp: 16   Height: 5' 9\" (1.753 m)   Weight: (!) 409 lb (185.5 kg)   SpO2: 98%        General: Comfortable, pleasant, appears stated age  Voice: Strong, speaking in full sentences, no stridor    Face: No masses or lesions, facial strength symmetric   Ears: External ears unremarkable. Bilateral ear canal clear with notable dryness; eczema at entrance of bilateral canal. Tympanic membrane clear and intact, with visible landmarks. Clear middle ear space  Moon: did not lateralize  Rinne: AC>BC in both ears  Nose: External nose unremarkable. Dorsum midline. Anterior rhinoscopy demonstrates no lesions. Septum midline. Turbinates pale and edematous. Oral Cavity / Oropharynx: No trismus. Mucosa pink and moist. No lesions. Tongue is midline and mobile. Palate elevates symmetrically. Uvula midline. Tonsils unremarkable. Base of tongue soft. Floor of mouth soft. Neck: Supple. No adenopathy. Thyroid unremarkable. Palpable laryngeal landmarks. Full neck range of motion   Neurologic: CN II - XI intact. Normal gait      Assessment and Plan:   1. Bilateral ears clogged   2. Bilateral tinnitus  3. Rhinitis    -Will trial Flonase.  - RX Elocon ointment to apply to bilateral ears for itching.  -Discussed etiologies of tinnitus/ETD. -Will get audio at next visit if symptoms persist.  -Return to office in 6 weeks.         Christiano Suggs MSN, FNP-C  [facility]  [address] [place] [address]  Office Phone: [phone]

## 2022-03-18 PROBLEM — E78.2 MIXED HYPERLIPIDEMIA: Status: ACTIVE | Noted: 2017-08-26

## 2022-03-18 PROBLEM — E66.01 MORBID OBESITY DUE TO EXCESS CALORIES (HCC): Status: ACTIVE | Noted: 2017-08-26

## 2022-03-19 PROBLEM — E11.65 UNCONTROLLED TYPE 2 DIABETES MELLITUS WITH HYPERGLYCEMIA, WITHOUT LONG-TERM CURRENT USE OF INSULIN (HCC): Status: ACTIVE | Noted: 2017-08-25

## 2022-03-20 PROBLEM — I10 ESSENTIAL HYPERTENSION: Status: ACTIVE | Noted: 2017-08-26

## 2022-04-04 ENCOUNTER — OFFICE VISIT (OUTPATIENT)
Dept: ENT CLINIC | Age: 47
End: 2022-04-04
Payer: MEDICARE

## 2022-04-04 VITALS
SYSTOLIC BLOOD PRESSURE: 132 MMHG | HEART RATE: 77 BPM | OXYGEN SATURATION: 99 % | DIASTOLIC BLOOD PRESSURE: 84 MMHG | WEIGHT: 293 LBS | HEIGHT: 69 IN | BODY MASS INDEX: 43.4 KG/M2 | RESPIRATION RATE: 18 BRPM | TEMPERATURE: 98 F

## 2022-04-04 DIAGNOSIS — L29.9 ITCHING OF EAR: ICD-10-CM

## 2022-04-04 DIAGNOSIS — H93.13 BILATERAL TINNITUS: ICD-10-CM

## 2022-04-04 DIAGNOSIS — J31.0 RHINITIS, UNSPECIFIED TYPE: Primary | ICD-10-CM

## 2022-04-04 PROCEDURE — G8427 DOCREV CUR MEDS BY ELIG CLIN: HCPCS | Performed by: NURSE PRACTITIONER

## 2022-04-04 PROCEDURE — G8754 DIAS BP LESS 90: HCPCS | Performed by: NURSE PRACTITIONER

## 2022-04-04 PROCEDURE — G8417 CALC BMI ABV UP PARAM F/U: HCPCS | Performed by: NURSE PRACTITIONER

## 2022-04-04 PROCEDURE — G8432 DEP SCR NOT DOC, RNG: HCPCS | Performed by: NURSE PRACTITIONER

## 2022-04-04 PROCEDURE — G8752 SYS BP LESS 140: HCPCS | Performed by: NURSE PRACTITIONER

## 2022-04-04 PROCEDURE — 99213 OFFICE O/P EST LOW 20 MIN: CPT | Performed by: NURSE PRACTITIONER

## 2022-04-04 NOTE — PROGRESS NOTES
Otolaryngology-Head and Neck Surgery  Follow Up Patient Visit     Patient: Althea Jarvis  YOB: 1975  MRN: [id number]  Date of Service:  4/4/2022    Chief Complaint: Bilateral ears clogged    History of Present Illness: Althea Jarvis is a 42y.o. year old female who was last seen 2/25/22 for bilateral ears clogged. she presents today for follow up.     2/25/22: Reports bilateral clogged for 2 months  Worse in left  Denies otalgia, congestion, rhinorrhea, PND  +otorrhea, tinnitus, dizziness, itching   Describes as intermittent buzz  Has used triamcinolone/ketoconazole ointment but does not help  Last hearing test, 4 years ago; normal at Dr. Bee Post  No family HX of hearing loss    Today reports some itching continues  Worse in left  Been using ointment daily  Flonase not consistent  Denies otalgia, dizziness  Occasional tinnitus      Past Medical History:  Past Medical History:   Diagnosis Date    Anemia     Anxiety     Benign essential hypertension     Carcinoma of endometrium (HCC)     Costochondritis     Degeneration of intervertebral disc of lumbar region     Depression     DJD (degenerative joint disease) of knee     DVT, lower extremity, distal, acute (HCC)     Fibroid     GERD (gastroesophageal reflux disease)     Hypercholesteremia     Obstructive sleep apnea     Pulmonary embolism, bilateral (HCC)     Type 2 diabetes mellitus ([facility]     Vitamin D deficiency        Past Surgical History:   Past Surgical History:   Procedure Laterality Date    HX CHOLECYSTECTOMY      HX TOTAL ABDOMINAL HYSTERECTOMY  04/01/2014    Dr. Liudmila Bradley       Medications:   Current Outpatient Medications   Medication Instructions    Eliquis 5 mg, 2 TIMES DAILY    fluticasone propionate (FLONASE) 50 mcg/actuation nasal spray 2 sprays in each nostril daily.     gabapentin (NEURONTIN) 300 mg, Oral, 2 TIMES DAILY    insulin aspart U-100 (NOVOLOG FLEXPEN U-100 INSULIN) 100 unit/mL (3 mL) inpn INJECT 10 UNITS WITH EACH MEAL PLUS SLIDING SCALE TO MAX OF 90 UNITS DAILY    insulin glargine (LANTUS SOLOSTAR U-100 INSULIN) 100 unit/mL (3 mL) inpn INJECT 50 UNITS SUBCUTANEOUSLY AT NIGHT    ketoconazole (NIZORAL) 2 % shampoo Topical, AS NEEDED    losartan (COZAAR) 50 mg, Oral, DAILY    metFORMIN ER (GLUCOPHAGE XR) 500 mg tablet Take one tab twice daily with meals.  mometasone (ELOCON) 0.1 % ointment Topical, DAILY, Bilateral ears.  pantoprazole (PROTONIX) 40 mg tablet No dose, route, or frequency recorded.  raNITIdine hcl 150 mg, 2 TIMES DAILY    triamcinolone acetonide (KENALOG) 0.1 % ointment Topical, AS NEEDED    Victoza 3-Agustin 1.8 mg, SubCUTAneous, DAILY       Allergies:   No Known Allergies    Social History:   Social History     Tobacco Use    Smoking status: Former Smoker     Packs/day: 0.50     Quit date: 2017     Years since quittin.1    Smokeless tobacco: Never Used   Substance Use Topics    Alcohol use: No    Drug use: Not on file       Family History:  Family History   Problem Relation Age of Onset    Hypertension Maternal Grandmother     Cancer Maternal Grandmother         breast    Cancer Maternal Grandfather         throat    Diabetes Maternal Grandfather        Review of Systems:  Consitutional: denies fever, excessive weight gain or loss. Eyes: denies diplopia, eye pain. Integumentary: denies new concerning skin lesions. Ears, Nose, Mouth, Throat: denies except as per HPI.   Endocrine: denies hot or cold intolerance, increased thirst.  Respiratory: denies cough, hemoptysis, wheezing  Gastrointestinal: denies trouble swallowing, nausea, emesis, regurgitation  Musculoskeletal: denies muscle weakness or wasting  Cardiovascular: denies chest pain, shortness of breath  Neurologic: denies seizures, numbness or tingling, syncope  Hematologic: denies easy bleeding or bruising    Physical Examination:   Vitals:    22 1105   BP: 132/84   BP 1 Location: Left upper arm   BP Patient Position: Sitting   BP Cuff Size: Large adult   Pulse: 77   Temp: 98 °F (36.7 °C)   TempSrc: Temporal   Resp: 18   Height: 5' 9\" (1.753 m)   Weight: (!) 409 lb (185.5 kg)   SpO2: 99%         General: Comfortable, pleasant, appears stated age  Voice: Strong, speaking in full sentences, no stridor    Face: No masses or lesions, facial strength symmetric   Ears: External ears unremarkable. Bilateral ear canal clear with minimal dryness noted. Tympanic membrane clear and intact, with visible landmarks. Clear middle ear space  Nose: External nose unremarkable. Dorsum midline. Anterior rhinoscopy demonstrates no lesions. Septum midline. Turbinates pale and edematous. Oral Cavity / Oropharynx: No trismus. Mucosa pink and moist. No lesions. Tongue is midline and mobile. Palate elevates symmetrically. Uvula midline. Tonsils unremarkable. Base of tongue soft. Floor of mouth soft. Neck: Supple. No adenopathy. Thyroid unremarkable. Palpable laryngeal landmarks. Full neck range of motion   Neurologic: CN II - XI intact. Normal gait      Assessment and Plan:   1. Bilateral tinnitus   2. Rhinitis   3. Ears itching    -Consistent use of Flonase.  -Continue Elocon ointment to ears. Vast improvement noted.   -Nasal saline  -Humidifier  -Tinnitus is only occasional now, would like to hold on audiogram for now.   -Return to office in 3 months              [address] MSN, FNP-C  [facility]  [address]  [facility]  Office Phone: [phone]

## 2022-04-04 NOTE — PROGRESS NOTES
Visit Vitals  /84 (BP 1 Location: Left upper arm, BP Patient Position: Sitting, BP Cuff Size: Large adult)   Pulse 77   Temp 98 °F (36.7 °C) (Temporal)   Resp 18   Ht 5' 9\" (1.753 m)   Wt (!) 409 lb (185.5 kg)   SpO2 99%   BMI 60.40 kg/m²     Chief Complaint   Patient presents with    Follow-up     ears

## 2022-05-09 ENCOUNTER — OFFICE VISIT (OUTPATIENT)
Dept: ENDOCRINOLOGY | Age: 47
End: 2022-05-09
Payer: MEDICARE

## 2022-05-09 VITALS
TEMPERATURE: 97.8 F | HEIGHT: 69 IN | OXYGEN SATURATION: 98 % | HEART RATE: 82 BPM | SYSTOLIC BLOOD PRESSURE: 145 MMHG | WEIGHT: 293 LBS | DIASTOLIC BLOOD PRESSURE: 101 MMHG | BODY MASS INDEX: 43.4 KG/M2

## 2022-05-09 DIAGNOSIS — E66.01 MORBID OBESITY DUE TO EXCESS CALORIES (HCC): ICD-10-CM

## 2022-05-09 DIAGNOSIS — E11.49 TYPE 2 DIABETES MELLITUS WITH OTHER NEUROLOGIC COMPLICATION, WITH LONG-TERM CURRENT USE OF INSULIN (HCC): Primary | ICD-10-CM

## 2022-05-09 DIAGNOSIS — I10 ESSENTIAL HYPERTENSION: ICD-10-CM

## 2022-05-09 DIAGNOSIS — Z79.4 TYPE 2 DIABETES MELLITUS WITH OTHER NEUROLOGIC COMPLICATION, WITH LONG-TERM CURRENT USE OF INSULIN (HCC): Primary | ICD-10-CM

## 2022-05-09 DIAGNOSIS — E78.2 MIXED HYPERLIPIDEMIA: ICD-10-CM

## 2022-05-09 LAB
GLUCOSE POC: 264 MG/DL
HBA1C MFR BLD HPLC: 12.3 %

## 2022-05-09 PROCEDURE — 82962 GLUCOSE BLOOD TEST: CPT | Performed by: INTERNAL MEDICINE

## 2022-05-09 PROCEDURE — 83036 HEMOGLOBIN GLYCOSYLATED A1C: CPT | Performed by: INTERNAL MEDICINE

## 2022-05-09 PROCEDURE — 99204 OFFICE O/P NEW MOD 45 MIN: CPT | Performed by: INTERNAL MEDICINE

## 2022-05-09 PROCEDURE — 3046F HEMOGLOBIN A1C LEVEL >9.0%: CPT | Performed by: INTERNAL MEDICINE

## 2022-05-09 RX ORDER — INSULIN ASPART 100 [IU]/ML
INJECTION, SOLUTION INTRAVENOUS; SUBCUTANEOUS
Qty: 18 ML | Refills: 3 | Status: SHIPPED | OUTPATIENT
Start: 2022-05-09

## 2022-05-09 RX ORDER — LANCETS 33 GAUGE
EACH MISCELLANEOUS
COMMUNITY
Start: 2022-01-31 | End: 2022-05-17 | Stop reason: SDUPTHER

## 2022-05-09 RX ORDER — SIMVASTATIN 40 MG/1
40 TABLET, FILM COATED ORAL DAILY
COMMUNITY
Start: 2022-04-22 | End: 2022-05-09 | Stop reason: ALTCHOICE

## 2022-05-09 RX ORDER — BLOOD SUGAR DIAGNOSTIC
STRIP MISCELLANEOUS
Qty: 100 STRIP | Refills: 5 | Status: SHIPPED | OUTPATIENT
Start: 2022-05-09

## 2022-05-09 RX ORDER — OMEPRAZOLE 40 MG/1
40 CAPSULE, DELAYED RELEASE ORAL DAILY
COMMUNITY
Start: 2022-03-24

## 2022-05-09 RX ORDER — LANCETS 33 GAUGE
EACH MISCELLANEOUS
Qty: 100 EACH | Refills: 5 | Status: SHIPPED | OUTPATIENT
Start: 2022-05-09

## 2022-05-09 RX ORDER — CYCLOBENZAPRINE HCL 10 MG
TABLET ORAL
COMMUNITY
Start: 2022-04-13

## 2022-05-09 RX ORDER — PEN NEEDLE, DIABETIC 30 GX3/16"
NEEDLE, DISPOSABLE MISCELLANEOUS
Qty: 200 EACH | Refills: 3 | Status: SHIPPED | OUTPATIENT
Start: 2022-05-09

## 2022-05-09 RX ORDER — DULAGLUTIDE 0.75 MG/.5ML
0.75 INJECTION, SOLUTION SUBCUTANEOUS
Qty: 2 ML | Refills: 3 | Status: SHIPPED | OUTPATIENT
Start: 2022-05-09 | End: 2022-06-08

## 2022-05-09 RX ORDER — GABAPENTIN 600 MG/1
600 TABLET ORAL 3 TIMES DAILY
COMMUNITY
Start: 2022-04-13

## 2022-05-09 RX ORDER — INSULIN GLARGINE 100 [IU]/ML
INJECTION, SOLUTION SUBCUTANEOUS
Qty: 24 ML | Refills: 3 | Status: SHIPPED | OUTPATIENT
Start: 2022-05-09

## 2022-05-09 RX ORDER — PEN NEEDLE, DIABETIC 32GX 5/32"
NEEDLE, DISPOSABLE MISCELLANEOUS
COMMUNITY
Start: 2022-01-31

## 2022-05-09 RX ORDER — INSULIN PUMP SYRINGE, 3 ML
EACH MISCELLANEOUS
Qty: 1 KIT | Refills: 0 | Status: SHIPPED | OUTPATIENT
Start: 2022-05-09

## 2022-05-09 RX ORDER — ATORVASTATIN CALCIUM 40 MG/1
40 TABLET, FILM COATED ORAL
Qty: 30 TABLET | Refills: 3 | Status: SHIPPED | OUTPATIENT
Start: 2022-05-09 | End: 2022-06-08

## 2022-05-09 NOTE — PROGRESS NOTES
History and Physical    Patient: Wilmar Monte MRN: [id number]  SSN: [id number]    YOB: 1975  Age: 42 y.o. Sex: female      Subjective: Wilmar Monte is a 42 y.o. female with past medical history of hypertension, hyperlipidemia, diabetic peripheral neuropathy, obstructive sleep apnea, PE on chronic anticoagulation is sent to me by primary care physician Dr. Myriam Prajapati for type 2 diabetes mellitus. Patient was diagnosed with diabetes a long time back. Currently taking Lantus 30 units twice a day, NovoLog 18 to 20 units after dinner, sometimes after breakfast as well, Victoza 1.8 mg daily, metformin  mg 1-2 times a day. Patient does not know exactly how to take her medications, that is why she has not been taking it properly. She eats 3 meals per day. Unable to exercise because of other comorbidities. Check blood glucose 3-4 times per day. Did not bring glucometer today. Reporting glucose always runs high. Up-to-date with diabetic eye exam.  For hyperlipidemia she is taking simvastatin 40 mg, sometimes in the morning and sometimes at night. No family history of coronary artery disease. She has obstructive sleep apnea but she does not wear CPAP because it is not comfortable and she is also scared about using it because of recent recalls. Glucometer reading: Did not bring glucometer today    · Diagnosis: long time back  · Current treatment: Lantus 30 units bid, Novolog 18-20 units after dinner, victoza 1.8 mg daily, metformin  mg 1-2 a day  · Past treatment: no  · Glucose checks: 3-4 times a day fastin in 200s, later between 300-400s   · Hyperglycemia: yes  · Hypoglycemia: no  · Meals per day: 3.  Breakfast: eggs, sausage, toast, juice or water, lunch: burger and fries and soda, dinner: vegetable, sweet tea and meat, snacks: candy or cake, soda, sweet tea, juice  · Exercise: no  · DM related hospitalizations: no    Smoking: no, vape  Family history of coronary artery disease/stroke: no     Complications of DM:  · CAD: no  · CVA: no  · PVD: no  · Amputations: no   · Retinopathy: no; last exam was   · Gastropathy: no  · Nephropathy: no  · Neuropathy: yes gabapentin  Sees podiatrist:    Medications:  · Statin: simvastatin 40 mg   · ACE-I: losartan   · ASA: no she is on eliquis for PE    · Diabetes education:  Past Medical History:   Diagnosis Date    Anemia     Anxiety     Benign essential hypertension     Carcinoma of endometrium (HCC)     Costochondritis     Degeneration of intervertebral disc of lumbar region     Depression     DJD (degenerative joint disease) of knee     DVT, lower extremity, distal, acute (HCC)     Fibroid     GERD (gastroesophageal reflux disease)     Hypercholesteremia     Obstructive sleep apnea     Pulmonary embolism, bilateral (HCC)     Type 2 diabetes mellitus ([facility]     Vitamin D deficiency      Past Surgical History:   Procedure Laterality Date    HX CHOLECYSTECTOMY      HX TOTAL ABDOMINAL HYSTERECTOMY  2014    Dr. Rosamaria Fischer      Family History   Problem Relation Age of Onset    Hypertension Maternal Grandmother     Cancer Maternal Grandmother         breast    Cancer Maternal Grandfather         throat    Diabetes Maternal Grandfather      Social History     Tobacco Use    Smoking status: Former Smoker     Packs/day: 0.50     Quit date: 2017     Years since quittin.2    Smokeless tobacco: Never Used   Substance Use Topics    Alcohol use: No      Prior to Admission medications    Medication Sig Start Date End Date Taking? Authorizing Provider   cyclobenzaprine (FLEXERIL) 10 mg tablet TAKE 1 TABLET BY MOUTH THREE TIMES DAILY AS NEEDED 22  Yes Provider, Historical   TRUEplus Lancets 33 gauge misc USE 1 TO CHECK GLUCOSE THREE TIMES DAILY 22  Yes Provider, Historical   omeprazole (PRILOSEC) 40 mg capsule Take 40 mg by mouth daily.  3/24/22  Yes Provider, Historical   BD Gabby 2nd Gen Pen Needle 32 gauge x 5/32\" ndle USE EACH THREE TIMES DAILY 1/31/22  Yes Provider, Historical   gabapentin (NEURONTIN) 600 mg tablet Take 600 mg by mouth three (3) times daily. 4/13/22  Yes Provider, Historical   dulaglutide (Trulicity) 7.47 NM/9.5 mL sub-q pen 0.5 mL by SubCUTAneous route every seven (7) days for 30 days. 5/9/22 6/8/22 Yes Agapito Leyden, MD   insulin glargine (Lantus Solostar U-100 Insulin) 100 unit/mL (3 mL) inpn Inject 40 units twice a day 5/9/22  Yes Agapito Leyden, MD   insulin aspart U-100 (NovoLOG Flexpen U-100 Insulin) 100 unit/mL (3 mL) inpn Inject 20 units before each meal 5/9/22  Yes Agapito Leyden, MD   Insulin Needles, Disposable, 31 gauge x 5/16\" ndle 4 times a day 5/9/22  Yes Agapito Leyden, MD   atorvastatin (LIPITOR) 40 mg tablet Take 1 Tablet by mouth nightly for 30 days. 5/9/22 6/8/22 Yes Agapito Leyden, MD   Blood-Glucose Meter (OneTouch Ultra2 Meter) monitoring kit Check glucose 3 times a day 5/9/22  Yes Agapito Leyden, MD   glucose blood VI test strips (OneTouch Ultra Test) strip Check glucose 3 times a day 5/9/22  Yes Agapito Leyden, MD   lancets (One Touch Delica) 33 gauge misc Check glucose 3 times a day 5/9/22  Yes Agapito Leyden, MD   fluticasone propionate (FLONASE) 50 mcg/actuation nasal spray 2 sprays in each nostril daily. 2/25/22  Yes Estleeann Sober H, NP   mometasone (ELOCON) 0.1 % ointment Apply  to affected area daily. Bilateral ears. 2/25/22  Yes Corrie Thomas NP   pantoprazole (PROTONIX) 40 mg tablet  1/29/19  Yes Provider, Historical   metFORMIN ER (GLUCOPHAGE XR) 500 mg tablet Take one tab twice daily with meals. 2/4/19  Yes Celsa Pandya MD   ELIQUIS 5 mg tablet 5 mg two (2) times a day. 8/10/17  Yes Provider, Historical   ketoconazole (NIZORAL) 2 % shampoo Apply  to affected area as needed. 8/4/17  Yes Provider, Historical   triamcinolone acetonide (KENALOG) 0.1 % ointment Apply  to affected area as needed.  8/4/17  Yes Provider, Historical   raNITIdine hcl 150 mg capsule 150 mg two (2) times a day. 8/4/17  Yes Provider, Historical   losartan (COZAAR) 50 mg tablet Take 50 mg by mouth daily. 8/10/17  Yes Provider, Historical        No Known Allergies    Review of Systems:  ROS    A comprehensive review of systems was preformed and it is negative except heat sensitivity, fatigue, weakness, dry skin, itching, foot sores, neck stiffness, blood clots, headache, dizziness, anxiety, back pain, joint pain    Objective:     Vitals:    05/09/22 1100 05/09/22 1110   BP: (!) 155/97 (!) 145/101   Pulse: 82    Temp: 97.8 °F (36.6 °C)    TempSrc: Temporal    SpO2: 98%    Weight: (!) 393 lb 12.8 oz (178.6 kg)    Height: 5' 9\" (1.753 m)         Physical Exam:    Physical Exam  Vitals and nursing note reviewed. Constitutional:       Appearance: She is obese. HENT:      Head: Normocephalic and atraumatic. Eyes:      Extraocular Movements: Extraocular movements intact. Comments: Mild arcus analysis present   Cardiovascular:      Rate and Rhythm: Normal rate and regular rhythm. Pulmonary:      Effort: Pulmonary effort is normal.      Breath sounds: Normal breath sounds. Musculoskeletal:         General: No swelling. Normal range of motion. Cervical back: Neck supple. Comments: No tendon xanthomas   Skin:     General: Skin is warm. Neurological:      General: No focal deficit present. Mental Status: She is alert and oriented to person, place, and time. Psychiatric:         Mood and Affect: Mood normal.         Behavior: Behavior normal.       diabetic foot exam:  Bilateral diabetic foot exam was performed today. Dorsalis pedis pulses 1+ bilaterally. Monofilament sensation normal bilaterally. No ulcers or skin breakdown.      Labs and Imaging:    Last 3 Recorded Weights in this Encounter    05/09/22 1100   Weight: (!) 393 lb 12.8 oz (178.6 kg)        Lab Results   Component Value Date/Time    Hemoglobin A1c, External 11.2 01/21/2019 12:00 AM        Assessment:     Patient Active Problem List Diagnosis Code    Uncontrolled type 2 diabetes mellitus with hyperglycemia, without long-term current use of insulin ([facility] E11.65    Mixed hyperlipidemia E78.2    Essential hypertension I10    Morbid obesity due to excess calories ([facility]) E66.01    BMI 50.0-59.9, adult ([facility]) Z68.43           Plan:     Type 2 diabetes mellitus, uncontrolled:  I reviewed progress note and labs from the referring provider's office. Hemoglobin A1c was 12.1% on 1-, 12.3% today. Fingerstick blood glucose is 264 mg/dL in my office today. Up to date with diabetes related annual labs: 1-   Up to date with diabetic eye exam: February 2022  Plan:  Discussed with patient importance of controlling diabetes to prevent endorgan damage. Discussed with patient some changes in her eating habits: Discussed about healthy snacking, avoiding all fruit juice, sweet tea and soda. Encourage patient to join fitness center for maybe water exercises as she has arthritis. Increase Lantus to 40 units twice a day. Increase NovoLog to 20 units and take it before each meal.  Switch from Victoza to Trulicity 8.42 mg weekly. Take metformin  mg 1 tablet with dinner only. Check blood glucose 3 times a day every day and I will see her back in 3 months. Essential hypertension:  Patient has microalbuminuria, last checked in January 2022. Blood pressure slightly elevated today. I will reevaluate at next visit. Hyperlipidemia:  1-: Total cholesterol 333, triglycerides 243, . Currently on simvastatin 40 mg and she is not taking it regularly. Stop simvastatin and start atorvastatin 40 mg, take it regularly at bedtime. Discussed about avoiding fried foods, cutting back on beef and pork. It is possible patient has heterozygous familial hypercholesterolemia. I will recheck lipid profile in a few months. Morbid obesity:  Patient qualifies for bariatric surgery. She will think about it. History of PE:   On chronic anticoagulation with Eliquis. Obstructive sleep apnea:  Not using CPAP. Orders Placed This Encounter    REFERRAL TO DIABETIC EDUCATION     Referral Priority:   Routine     Referral Type:   Consultation     Referral Reason:   Specialty Services Required     Number of Visits Requested:   1    AMB POC GLUCOSE BLOOD, BY GLUCOSE MONITORING DEVICE    AMB POC HEMOGLOBIN A1C    dulaglutide (Trulicity) 3.23 KA/6.2 mL sub-q pen     Si.5 mL by SubCUTAneous route every seven (7) days for 30 days. Dispense:  2 mL     Refill:  3     DC victoza    insulin glargine (Lantus Solostar U-100 Insulin) 100 unit/mL (3 mL) inpn     Sig: Inject 40 units twice a day     Dispense:  24 mL     Refill:  3     appt needed for more refills    insulin aspart U-100 (NovoLOG Flexpen U-100 Insulin) 100 unit/mL (3 mL) inpn     Sig: Inject 20 units before each meal     Dispense:  18 mL     Refill:  3    Insulin Needles, Disposable, 31 gauge x 5/16\" ndle     Si times a day     Dispense:  200 Each     Refill:  3    atorvastatin (LIPITOR) 40 mg tablet     Sig: Take 1 Tablet by mouth nightly for 30 days.      Dispense:  30 Tablet     Refill:  3     DC simvastatin    Blood-Glucose Meter (OneTouch Ultra2 Meter) monitoring kit     Sig: Check glucose 3 times a day     Dispense:  1 Kit     Refill:  0    glucose blood VI test strips (OneTouch Ultra Test) strip     Sig: Check glucose 3 times a day     Dispense:  100 Strip     Refill:  5    lancets (One Touch Delica) 33 gauge misc     Sig: Check glucose 3 times a day     Dispense:  100 Each     Refill:  5        Signed By: Frederic Coleman MD     May 9, 2022      Return to clinic

## 2022-05-09 NOTE — LETTER
5/9/2022    Patient: Paulina Herrera   YOB: 1975   Date of Visit: 5/9/2022     Juhi Valentine MD  [facility] [id number]  Via Fax: [phone]    Dear Juhi Valentine MD,      Thank you for referring Ms. Larry Gage to [address] [place] [address] for evaluation. My notes for this consultation are attached. If you have questions, please do not hesitate to call me. I look forward to following your patient along with you.       Sincerely,    Elizabeth Webber MD

## 2022-05-09 NOTE — PATIENT INSTRUCTIONS
Lantus 40 units twice a day (same time everyday)    Novolog 20 units before each meal    Check glucose 3 times a day before meals (and taking Novolog)    Stop Victoza and start Trylicity 0.82 mg week    Take metformin  mg one tab with dinner    Stop Simvastatin and start atorvastatin 40 mg at bedtime

## 2022-05-11 ENCOUNTER — TELEPHONE (OUTPATIENT)
Dept: ENDOCRINOLOGY | Age: 47
End: 2022-05-11

## 2022-05-12 NOTE — TELEPHONE ENCOUNTER
LVM to inform pt that Dr. Larissa Morales sent Audrey injing to [facility] in [facility] on 5/9/2022

## 2022-05-17 ENCOUNTER — TELEPHONE (OUTPATIENT)
Dept: ENDOCRINOLOGY | Age: 47
End: 2022-05-17

## 2022-05-17 DIAGNOSIS — E11.49 TYPE 2 DIABETES MELLITUS WITH OTHER NEUROLOGIC COMPLICATION, WITH LONG-TERM CURRENT USE OF INSULIN (HCC): Primary | ICD-10-CM

## 2022-05-17 DIAGNOSIS — Z79.4 TYPE 2 DIABETES MELLITUS WITH OTHER NEUROLOGIC COMPLICATION, WITH LONG-TERM CURRENT USE OF INSULIN (HCC): Primary | ICD-10-CM

## 2022-05-17 RX ORDER — LANCETS 33 GAUGE
EACH MISCELLANEOUS
Qty: 300 EACH | Refills: 3 | Status: SHIPPED | OUTPATIENT
Start: 2022-05-17

## 2022-05-17 RX ORDER — CALCIUM CITRATE/VITAMIN D3 200MG-6.25
TABLET ORAL
Qty: 300 STRIP | Refills: 3 | Status: SHIPPED | OUTPATIENT
Start: 2022-05-17

## 2022-05-17 NOTE — TELEPHONE ENCOUNTER
Patient is calling to inform that the test strips that were ordered were incorrect as she has a Matrix meter not one touch ultra meter. Can you order her test strips for her old meter and please give her call?  She is completely out of test strips

## 2022-05-20 LAB — CREATININE, EXTERNAL: 0.73

## 2022-05-23 ENCOUNTER — TRANSCRIBE ORDER (OUTPATIENT)
Dept: SCHEDULING | Age: 47
End: 2022-05-23

## 2022-05-23 DIAGNOSIS — R92.8 ABNORMAL MAMMOGRAM: Primary | ICD-10-CM

## 2022-11-18 DIAGNOSIS — E11.65 UNCONTROLLED TYPE 2 DIABETES MELLITUS WITH HYPERGLYCEMIA, WITHOUT LONG-TERM CURRENT USE OF INSULIN (HCC): ICD-10-CM

## 2022-11-19 RX ORDER — LOSARTAN POTASSIUM 50 MG/1
TABLET ORAL
Qty: 30 TABLET | Refills: 0 | Status: SHIPPED | OUTPATIENT
Start: 2022-11-19

## 2022-12-14 DIAGNOSIS — E11.49 TYPE 2 DIABETES MELLITUS WITH OTHER NEUROLOGIC COMPLICATION, WITH LONG-TERM CURRENT USE OF INSULIN (HCC): ICD-10-CM

## 2022-12-14 DIAGNOSIS — Z79.4 TYPE 2 DIABETES MELLITUS WITH OTHER NEUROLOGIC COMPLICATION, WITH LONG-TERM CURRENT USE OF INSULIN (HCC): ICD-10-CM

## 2022-12-14 NOTE — TELEPHONE ENCOUNTER
Requested Prescriptions     Pending Prescriptions Disp Refills    insulin glargine (Lantus Solostar U-100 Insulin) 100 unit/mL (3 mL) inpn 24 mL 3     Sig: Inject 40 units twice a day    insulin aspart U-100 (NovoLOG Flexpen U-100 Insulin) 100 unit/mL (3 mL) inpn 18 mL 3     Sig: Inject 20 units before each meal     Patients endo has left practice and patient is needing refills.

## 2022-12-15 RX ORDER — INSULIN ASPART 100 [IU]/ML
INJECTION, SOLUTION INTRAVENOUS; SUBCUTANEOUS
Qty: 18 ML | Refills: 3 | Status: SHIPPED | OUTPATIENT
Start: 2022-12-15

## 2022-12-15 RX ORDER — INSULIN GLARGINE 100 [IU]/ML
INJECTION, SOLUTION SUBCUTANEOUS
Qty: 24 ML | Refills: 0 | Status: SHIPPED | OUTPATIENT
Start: 2022-12-15

## 2022-12-28 DIAGNOSIS — E11.65 UNCONTROLLED TYPE 2 DIABETES MELLITUS WITH HYPERGLYCEMIA, WITHOUT LONG-TERM CURRENT USE OF INSULIN (HCC): ICD-10-CM

## 2022-12-28 RX ORDER — CYCLOBENZAPRINE HCL 10 MG
TABLET ORAL
Qty: 90 TABLET | Refills: 0 | Status: SHIPPED | OUTPATIENT
Start: 2022-12-28

## 2022-12-28 RX ORDER — METFORMIN HYDROCHLORIDE 500 MG/1
TABLET, EXTENDED RELEASE ORAL
Qty: 120 TABLET | Refills: 0 | Status: SHIPPED | OUTPATIENT
Start: 2022-12-28

## 2022-12-28 RX ORDER — LOSARTAN POTASSIUM 50 MG/1
TABLET ORAL
Qty: 30 TABLET | Refills: 0 | Status: SHIPPED | OUTPATIENT
Start: 2022-12-28

## 2023-01-07 VITALS
WEIGHT: 293 LBS | HEIGHT: 72 IN | SYSTOLIC BLOOD PRESSURE: 126 MMHG | BODY MASS INDEX: 39.68 KG/M2 | DIASTOLIC BLOOD PRESSURE: 87 MMHG | RESPIRATION RATE: 16 BRPM

## 2023-01-07 PROBLEM — G57.12 MERALGIA PARAESTHETICA, LEFT: Status: ACTIVE | Noted: 2023-01-07

## 2023-01-07 PROBLEM — C54.1 ENDOMETRIAL CANCER (HCC): Status: ACTIVE | Noted: 2023-01-07

## 2023-01-07 PROBLEM — M16.0 PRIMARY OSTEOARTHRITIS OF BOTH HIPS: Status: ACTIVE | Noted: 2023-01-07

## 2023-01-07 PROBLEM — G89.4 CHRONIC PAIN DISORDER: Status: ACTIVE | Noted: 2023-01-07

## 2023-01-07 PROBLEM — I26.99 PULMONARY EMBOLISM, BILATERAL (HCC): Status: ACTIVE | Noted: 2023-01-07

## 2023-01-07 PROBLEM — E78.00 PURE HYPERCHOLESTEROLEMIA: Status: ACTIVE | Noted: 2023-01-07

## 2023-01-07 RX ORDER — ESCITALOPRAM OXALATE 10 MG/1
10 TABLET ORAL DAILY
COMMUNITY

## 2023-01-07 RX ORDER — FUROSEMIDE 40 MG/1
40 TABLET ORAL
COMMUNITY

## 2023-02-14 DIAGNOSIS — I10 BENIGN ESSENTIAL HYPERTENSION: Primary | ICD-10-CM

## 2023-02-27 DIAGNOSIS — E11.65 UNCONTROLLED TYPE 2 DIABETES MELLITUS WITH HYPERGLYCEMIA, WITHOUT LONG-TERM CURRENT USE OF INSULIN (HCC): Primary | ICD-10-CM

## 2023-02-27 DIAGNOSIS — Z11.59 ENCOUNTER FOR HEPATITIS C SCREENING TEST FOR LOW RISK PATIENT: ICD-10-CM

## 2023-03-01 LAB
CREATININE, EXTERNAL: 0.66
HBA1C MFR BLD HPLC: 12.8 %
LDL-C, EXTERNAL: 252
TOTAL CHOLESTEROL, NCHOLT: 371

## 2023-03-02 LAB
ALBUMIN SERPL-MCNC: 4.8 G/DL (ref 3.8–4.8)
ALBUMIN/CREAT UR: 1141 MG/G CREAT (ref 0–29)
ALBUMIN/GLOB SERPL: 1.2 {RATIO} (ref 1.2–2.2)
ALP SERPL-CCNC: 118 IU/L (ref 44–121)
ALT SERPL-CCNC: 24 IU/L (ref 0–32)
AST SERPL-CCNC: 25 IU/L (ref 0–40)
BILIRUB SERPL-MCNC: 0.4 MG/DL (ref 0–1.2)
BUN SERPL-MCNC: 10 MG/DL (ref 6–24)
BUN/CREAT SERPL: 15 (ref 9–23)
CALCIUM SERPL-MCNC: 9.8 MG/DL (ref 8.7–10.2)
CHLORIDE SERPL-SCNC: 94 MMOL/L (ref 96–106)
CHOLEST SERPL-MCNC: 371 MG/DL (ref 100–199)
CO2 SERPL-SCNC: 22 MMOL/L (ref 20–29)
CREAT SERPL-MCNC: 0.66 MG/DL (ref 0.57–1)
CREAT UR-MCNC: 23.4 MG/DL
EGFRCR SERPLBLD CKD-EPI 2021: 110 ML/MIN/1.73
EST. AVERAGE GLUCOSE BLD GHB EST-MCNC: 321 MG/DL
GLOBULIN SER CALC-MCNC: 3.9 G/DL (ref 1.5–4.5)
GLUCOSE SERPL-MCNC: 342 MG/DL (ref 70–99)
HBA1C MFR BLD: 12.8 % (ref 4.8–5.6)
HCV IGG SERPL QL IA: NON REACTIVE
HDLC SERPL-MCNC: 62 MG/DL
LABORATORY COMMENT REPORT: ABNORMAL
LDLC SERPL CALC-MCNC: 252 MG/DL (ref 0–99)
MICROALBUMIN UR-MCNC: 267 UG/ML
POTASSIUM SERPL-SCNC: 3.7 MMOL/L (ref 3.5–5.2)
PROT SERPL-MCNC: 8.7 G/DL (ref 6–8.5)
SODIUM SERPL-SCNC: 138 MMOL/L (ref 134–144)
TRIGL SERPL-MCNC: 269 MG/DL (ref 0–149)
VLDLC SERPL CALC-MCNC: 57 MG/DL (ref 5–40)

## 2023-03-03 ENCOUNTER — OFFICE VISIT (OUTPATIENT)
Dept: INTERNAL MEDICINE CLINIC | Age: 48
End: 2023-03-03

## 2023-03-03 VITALS
OXYGEN SATURATION: 98 % | HEIGHT: 69 IN | WEIGHT: 293 LBS | SYSTOLIC BLOOD PRESSURE: 128 MMHG | DIASTOLIC BLOOD PRESSURE: 88 MMHG | HEART RATE: 98 BPM | BODY MASS INDEX: 43.4 KG/M2 | TEMPERATURE: 97 F

## 2023-03-03 DIAGNOSIS — I10 ESSENTIAL HYPERTENSION: ICD-10-CM

## 2023-03-03 DIAGNOSIS — F32.A ANXIETY AND DEPRESSION: ICD-10-CM

## 2023-03-03 DIAGNOSIS — Z91.199 NON COMPLIANCE WITH MEDICAL TREATMENT: ICD-10-CM

## 2023-03-03 DIAGNOSIS — E78.2 MIXED HYPERLIPIDEMIA: ICD-10-CM

## 2023-03-03 DIAGNOSIS — Z12.11 COLON CANCER SCREENING: ICD-10-CM

## 2023-03-03 DIAGNOSIS — E11.65 UNCONTROLLED DIABETES MELLITUS WITH HYPERGLYCEMIA, WITH LONG-TERM CURRENT USE OF INSULIN (HCC): Primary | ICD-10-CM

## 2023-03-03 DIAGNOSIS — Q84.5 ENLARGED AND HYPERTROPHIC NAILS: ICD-10-CM

## 2023-03-03 DIAGNOSIS — E11.9 ENCOUNTER FOR DIABETIC FOOT EXAM (HCC): ICD-10-CM

## 2023-03-03 DIAGNOSIS — N89.8 VAGINAL DISCHARGE: ICD-10-CM

## 2023-03-03 DIAGNOSIS — F41.9 ANXIETY AND DEPRESSION: ICD-10-CM

## 2023-03-03 DIAGNOSIS — I26.99 PULMONARY EMBOLISM, BILATERAL (HCC): ICD-10-CM

## 2023-03-03 DIAGNOSIS — Z79.4 UNCONTROLLED DIABETES MELLITUS WITH HYPERGLYCEMIA, WITH LONG-TERM CURRENT USE OF INSULIN (HCC): Primary | ICD-10-CM

## 2023-03-03 RX ORDER — APIXABAN 5 MG/1
5 TABLET, FILM COATED ORAL 2 TIMES DAILY
Qty: 60 TABLET | Refills: 0 | Status: SHIPPED | OUTPATIENT
Start: 2023-03-03

## 2023-03-03 RX ORDER — TIRZEPATIDE 2.5 MG/.5ML
2.5 INJECTION, SOLUTION SUBCUTANEOUS
Qty: 4 PEN | Refills: 3 | Status: SHIPPED | OUTPATIENT
Start: 2023-03-03

## 2023-03-03 RX ORDER — INSULIN GLARGINE 100 [IU]/ML
INJECTION, SOLUTION SUBCUTANEOUS
Qty: 24 ML | Refills: 0 | Status: SHIPPED | OUTPATIENT
Start: 2023-03-03

## 2023-03-03 RX ORDER — FLUCONAZOLE 150 MG/1
150 TABLET ORAL DAILY
Qty: 1 TABLET | Refills: 0 | Status: SHIPPED | OUTPATIENT
Start: 2023-03-03 | End: 2023-03-04

## 2023-03-03 RX ORDER — ROSUVASTATIN CALCIUM 10 MG/1
10 TABLET, COATED ORAL
Qty: 90 TABLET | Refills: 1 | Status: SHIPPED | OUTPATIENT
Start: 2023-03-03

## 2023-03-03 RX ORDER — KETOCONAZOLE 20 MG/G
2 CREAM TOPICAL DAILY
COMMUNITY
End: 2023-03-03

## 2023-03-03 NOTE — PROGRESS NOTES
[address] [facility] Medicine  [address]  [place], [address] [facility]  Phone: [phone]      Shandra Yung (: 1975) is a 41 y.o. female, established patient, here for evaluation of the following chief complaint(s):  Labs, Follow Up Chronic Condition, and Joint Pain         SUBJECTIVE/OBJECTIVE:  HPI:  Patient is here for follow up, she recently had blood work. Her last office visit was in . She checks her blood sugar 4 times daily; some of her blood sugar ranges 116-486. She denies any episodes of low blood sugar and she needs to schedule her annual eye exam for . She stated she used to see Dr. Omar Boateng but has not seen any endocrinologist since Dr. Omar Boateng left. Patient stated Dr. Omar Boateng changed her Victoza to a weekly injection but she got it. She states that she is still having a lot of joint pain, she has not been to Ortho in awhile. She states that she is not taking gabapentin as it did not help her she states that she is in pain everyday all day, and it is a 10 out of 10. She needs refills on all of her medicines. Patient stated that she had not seen Yamil Zheng for a while and appointment is at end of this month so she needs a refill for her blood thinner also. 23 Glu 342, Creat 0.66, K+ 3.7, Prot T 8.7, Chol T 371, Trig 269, HDL 62, VLDL 57, , A1c 12.8, Alb/Creat pending, HCV Neg.  Prior to Admission medications    Medication Sig Start Date End Date Taking? Authorizing Provider   diphth,pertus,acell,,tetanus (BOOSTRIX TDAP) 2.5-8-5 Lf-mcg-Lf/0.5mL susp suspension 0.5 mL by IntraMUSCular route once for 1 dose. Indications: vaccination to prevent diphtheria, pertussis and tetanus 3/3/23 3/3/23 Yes Stewart Merino MD   rosuvastatin (CRESTOR) 10 mg tablet Take 1 Tablet by mouth nightly. 3/3/23  Yes Stewart Merino MD   tirzepatide [facility]) 2.5 mg/0.5 mL pnij 2.5 mg by SubCUTAneous route every seven (7) days.  3/3/23  Yes Lesli Ng, MD Aundrea   Eliquis 5 mg tablet Take 1 Tablet by mouth two (2) times a day. 3/3/23  Yes Norma Antonio MD   insulin glargine (Lantus Solostar U-100 Insulin) 100 unit/mL (3 mL) inpn Inject 40 units twice a day 3/3/23  Yes Norma Antonio MD   furosemide (LASIX) 40 mg tablet Take 40 mg by mouth daily as needed. Yes Provider, Historical   glucose blood VI test strips (True Metrix Glucose Test Strip) strip Check glucose 3 times a day, 90 days 5/17/22  Yes Renan Manzano MD   omeprazole (PRILOSEC) 40 mg capsule Take 40 mg by mouth daily. 3/24/22  Yes Provider, Historical   BD Gabby 2nd Gen Pen Needle 32 gauge x 5/32\" ndle USE EACH THREE TIMES DAILY 1/31/22  Yes Provider, Historical   Blood-Glucose Meter (OneTouch Ultra2 Meter) monitoring kit Check glucose 3 times a day 5/9/22  Yes Renan Manzano MD   glucose blood VI test strips (OneTouch Ultra Test) strip Check glucose 3 times a day 5/9/22  Yes Renan Manzano MD   lancets (One Touch Delica) 33 gauge misc Check glucose 3 times a day 5/9/22  Yes Renan Manzano MD   fluticasone propionate (FLONASE) 50 mcg/actuation nasal spray 2 sprays in each nostril daily. 2/25/22  Yes Anjum Dejesus NP   ketoconazole (NIZORAL) 2 % shampoo Apply  to affected area as needed. 8/4/17  Yes Provider, Historical   triamcinolone acetonide (KENALOG) 0.1 % ointment Apply  to affected area as needed. 8/4/17  Yes Provider, Historical   raNITIdine hcl 150 mg capsule 150 mg two (2) times a day. 8/4/17  Yes Provider, Historical   losartan (COZAAR) 50 mg tablet Take 1 Tablet by mouth daily.  3/3/23   Norma Antonio MD   metFORMIN ER (GLUCOPHAGE XR) 500 mg tablet Take 2 tablets by mouth twice daily 3/3/23   Norma Antonio MD   cyclobenzaprine (FLEXERIL) 10 mg tablet Take 1 tablet by mouth three times daily as needed 3/3/23   Norma Antonio MD   insulin aspart U-100 (NovoLOG FlexPen U-100 Insulin) 100 unit/mL (3 mL) inpn Inject 20 units before each meal 3/3/23   Norma Antonio MD TRUEplus Lancets 33 gauge misc Check glucose 3 times a day, 90 days  Patient not taking: Reported on 3/3/2023 5/17/22   Margarita Suh MD   Insulin Needles, Disposable, 31 gauge x 5/16\" ndle 4 times a day  Patient not taking: Reported on 3/3/2023 5/9/22   Margarita Suh MD        No Known Allergies     Past Medical History:   Diagnosis Date    Anemia     Anxiety     Benign essential hypertension     Benign hypertension     Carcinoma of endometrium ([address]     Chronic pain disorder     Costochondritis     Degeneration of intervertebral disc of lumbar region     Depression     DJD (degenerative joint disease) of knee     DVT, lower extremity, distal, acute (HCC)     Fibroid     GERD (gastroesophageal reflux disease)     Hypercholesteremia     Hypercholesterolemia     Meralgia paresthetica, left     Obstructive sleep apnea     Primary localized osteoarthritis of hips, bilateral     Pulmonary embolism, bilateral (HCC)     Type 2 diabetes mellitus ([address]     Type II or unspecified type diabetes mellitus with ketoacidosis, uncontrolled(250.12) ([address]     Vitamin D deficiency         Family History   Problem Relation Age of Onset    Hypertension Maternal Grandmother     Cancer Maternal Grandmother         breast    Cancer Maternal Grandfather         throat    Diabetes Maternal Grandfather         Past Surgical History:   Procedure Laterality Date    HX CHOLECYSTECTOMY      HX TOTAL ABDOMINAL HYSTERECTOMY  04/01/2014    Dr. Christine Real       Review of Systems   Constitutional:  Negative for chills and fever. HENT:  Negative for congestion, ear pain, nosebleeds, sinus pain, sore throat and tinnitus. Eyes:  Negative for redness. Respiratory:  Negative for cough and shortness of breath. Cardiovascular:  Negative for chest pain and palpitations. Gastrointestinal:  Negative for abdominal pain, diarrhea, nausea and vomiting. Endocrine: Negative for cold intolerance and polyuria.    Genitourinary:  Negative for dysuria and hematuria. Musculoskeletal:  Positive for arthralgias and myalgias. Negative for back pain and neck pain. Skin:  Negative for rash. Neurological:  Negative for dizziness and headaches. Psychiatric/Behavioral: Negative. /88 (BP 1 Location: Left lower arm, BP Patient Position: Sitting)   Pulse 98   Temp 97 °F (36.1 °C) (Temporal)   Ht 5' 9\" (1.753 m)   Wt (!) 396 lb (179.6 kg)   SpO2 98%   BMI 58.48 kg/m²      Physical Exam  Constitutional:       Appearance: Morbidly obese patient . HENT:      Head: Normocephalic and atraumatic. Right Ear: External ear normal.      Left Ear: External ear normal.      Nose: Nose normal.      Mouth/Throat:      Mouth: Mucous membranes are moist.   Eyes:      Extraocular Movements: Extraocular movements intact. Pupils: Pupils are equal, round, and reactive to light. Cardiovascular:      Rate and Rhythm: Normal rate and regular rhythm. Pulmonary:      Effort: Pulmonary effort is normal.      Breath sounds: Normal breath sounds. Abdominal:      Palpations: Abdomen is soft. Tenderness: There is no abdominal tenderness. Musculoskeletal:      Cervical back: Normal range of motion and neck supple. Right lower leg: No edema. Left lower leg: No edema. Diabetic foot examination shows pigmentation and thickening of the skin of the toes, thickening hypertrophy and subungual hyperkeratosis of the toenails, dorsalis pedis present bilaterally, touch sensation normal  Skin:     General: Skin is warm and dry. Neurological:      General: No focal deficit present. Mental Status: she is alert and oriented to person, place, and time. Psychiatric:         Mood and Affect: Mood normal.    ASSESSMENT/PLAN:  Below is the assessment and plan developed based on review of pertinent history, physical exam, labs, studies, and medications.     1. Uncontrolled diabetes mellitus with hyperglycemia, with long-term current use of insulin [facility])  Comments:  Diabetes is uncontrolled with a glucose of 342, A1c of 12.8. Patient has not been compliant with her medications. Advised to continue Lantus 40 units twice a   Orders:  -     REFERRAL TO ENDOCRINOLOGY  -     tirzepatide (Mounjaro) 2.5 mg/0.5 mL pnij; 2.5 mg by SubCUTAneous route every seven (7) days. , Normal, Disp-4 Pen, R-3  -     insulin glargine (Lantus Solostar U-100 Insulin) 100 unit/mL (3 mL) inpn; Inject 40 units twice a day, Normal, Disp-24 mL, R-0Please send refill requests to herb endocrinologist  -     METABOLIC PANEL, COMPREHENSIVE; Future  -     HEMOGLOBIN A1C WITH EAG; Future  2. Mixed hyperlipidemia  Comments: Total cholesterol 371, triglyceride 269, HDL 62, , patient has not been taking her statin, advised low-fat diet will start rosuvastatin 10 mg  Orders:  -     LIPID PANEL; Future  -     TSH 3RD GENERATION; Future  3. Essential hypertension  Comments:  Blood pressure is controlled, advised to continue low-sodium diet and losartan  4. Vaginal discharge  Comments:  Likely yeast infection due to uncontrolled diabetes will treat with Diflucan, advised to follow-up with GYN as she has history of endometrial carcinoma also  5. Colon cancer screening  Comments:  Advised colonoscopy with Dr. King Dias and information given to patient  Orders:  -     REFERRAL TO GASTROENTEROLOGY  6. Anxiety and depression  Comments:  Counseled  patient and will get counseling  Orders:  -     REFERRAL TO PSYCHOLOGY  7. Non compliance with medical treatment  Comments:  Need for compliance with the follow-up and treatment discussed with the patient  8. Enlarged and hypertrophic nails  Comments: Will get consult with Dr. Chitra Berg  Orders:  -     Edwin Mg  9. Pulmonary embolism, bilateral ([facility]  Comments: On Eliquis, need for follow-up with the hematologist discussed with the patient and refill for 1 month given to patient  Orders:  -     Eliquis 5 mg tablet;  Take 1 Tablet by mouth two (2) times a day., Normal, Disp-60 Tablet, R-0, KIMANI    Return in about 4 months (around 7/3/2023). There are no Patient Instructions on file for this visit. Health Maintenance Due   Topic Date Due    Pneumococcal 0-64 years (1 - PCV) Never done    Hepatitis B Vaccine (1 of 3 - Risk 3-dose series) Never done    DTaP/Tdap/Td series (1 - Tdap) Never done    Foot Exam Q1  02/04/2020    Medicare Yearly Exam  Never done    Colorectal Cancer Screening Combo  Never done        Aspects of this note may have been generated using voice recognition software. Despite editing, there may be unrecognized errors. An electronic signature was used to authenticate this note.   -- Kelly Mccarthy MD

## 2023-03-03 NOTE — PROGRESS NOTES
Chief Complaint   Patient presents with    Labs    Follow Up Chronic Condition    Joint Pain     1. \"Have you been to the ER, urgent care clinic since your last visit? Hospitalized since your last visit? \" No    2. \"Have you seen or consulted any other health care providers outside of the [address] [place] [address] since your last visit? \" No     3. For patients aged 39-70: Has the patient had a colonoscopy / FIT/ Cologuard? No      If the patient is female:    4. For patients aged 41-77: Has the patient had a mammogram within the past 2 years? No      5. For patients aged 21-65: Has the patient had a pap smear?  No

## 2023-03-16 RX ORDER — OMEPRAZOLE 40 MG/1
40 CAPSULE, DELAYED RELEASE ORAL DAILY
Qty: 90 CAPSULE | Refills: 1 | Status: SHIPPED | OUTPATIENT
Start: 2023-03-16

## 2023-07-03 RX ORDER — FUROSEMIDE 40 MG/1
TABLET ORAL
Qty: 15 TABLET | Refills: 0 | Status: SHIPPED | OUTPATIENT
Start: 2023-07-03

## 2023-07-13 ENCOUNTER — OFFICE VISIT (OUTPATIENT)
Facility: CLINIC | Age: 48
End: 2023-07-13
Payer: MEDICAID

## 2023-07-13 VITALS
OXYGEN SATURATION: 97 % | BODY MASS INDEX: 43.4 KG/M2 | WEIGHT: 293 LBS | SYSTOLIC BLOOD PRESSURE: 128 MMHG | TEMPERATURE: 98.5 F | HEART RATE: 114 BPM | HEIGHT: 69 IN | DIASTOLIC BLOOD PRESSURE: 84 MMHG

## 2023-07-13 DIAGNOSIS — E78.2 MIXED HYPERLIPIDEMIA: ICD-10-CM

## 2023-07-13 DIAGNOSIS — M16.0 PRIMARY OSTEOARTHRITIS OF BOTH HIPS: Primary | ICD-10-CM

## 2023-07-13 DIAGNOSIS — N89.8 VAGINAL DISCHARGE: ICD-10-CM

## 2023-07-13 DIAGNOSIS — Z79.4 TYPE 2 DIABETES MELLITUS WITH HYPERGLYCEMIA, WITH LONG-TERM CURRENT USE OF INSULIN (HCC): ICD-10-CM

## 2023-07-13 DIAGNOSIS — I10 ESSENTIAL HYPERTENSION, BENIGN: ICD-10-CM

## 2023-07-13 DIAGNOSIS — E11.65 TYPE 2 DIABETES MELLITUS WITH HYPERGLYCEMIA, WITH LONG-TERM CURRENT USE OF INSULIN (HCC): ICD-10-CM

## 2023-07-13 LAB
BILIRUBIN, URINE, POC: NEGATIVE
BLOOD URINE, POC: NEGATIVE
GLUCOSE URINE, POC: 1000
KETONES, URINE, POC: NEGATIVE
LEUKOCYTE ESTERASE, URINE, POC: NEGATIVE
NITRITE, URINE, POC: NEGATIVE
PH, URINE, POC: 5 (ref 4.6–8)
PROTEIN,URINE, POC: ABNORMAL
SPECIFIC GRAVITY, URINE, POC: 1 (ref 1–1.03)
URINALYSIS CLARITY, POC: CLEAR
URINALYSIS COLOR, POC: YELLOW
UROBILINOGEN, POC: ABNORMAL

## 2023-07-13 PROCEDURE — 81002 URINALYSIS NONAUTO W/O SCOPE: CPT | Performed by: INTERNAL MEDICINE

## 2023-07-13 PROCEDURE — 99214 OFFICE O/P EST MOD 30 MIN: CPT | Performed by: INTERNAL MEDICINE

## 2023-07-13 PROCEDURE — 3079F DIAST BP 80-89 MM HG: CPT | Performed by: INTERNAL MEDICINE

## 2023-07-13 PROCEDURE — 3074F SYST BP LT 130 MM HG: CPT | Performed by: INTERNAL MEDICINE

## 2023-07-13 PROCEDURE — 3046F HEMOGLOBIN A1C LEVEL >9.0%: CPT | Performed by: INTERNAL MEDICINE

## 2023-07-13 RX ORDER — TIRZEPATIDE 5 MG/.5ML
5 INJECTION, SOLUTION SUBCUTANEOUS WEEKLY
Qty: 2 ML | Refills: 0 | Status: SHIPPED | OUTPATIENT
Start: 2023-07-13

## 2023-07-13 RX ORDER — FLUCONAZOLE 150 MG/1
150 TABLET ORAL ONCE
Qty: 1 TABLET | Refills: 0 | Status: SHIPPED | OUTPATIENT
Start: 2023-07-13 | End: 2023-07-13

## 2023-07-13 SDOH — ECONOMIC STABILITY: FOOD INSECURITY: WITHIN THE PAST 12 MONTHS, YOU WORRIED THAT YOUR FOOD WOULD RUN OUT BEFORE YOU GOT MONEY TO BUY MORE.: OFTEN TRUE

## 2023-07-13 SDOH — ECONOMIC STABILITY: INCOME INSECURITY: HOW HARD IS IT FOR YOU TO PAY FOR THE VERY BASICS LIKE FOOD, HOUSING, MEDICAL CARE, AND HEATING?: VERY HARD

## 2023-07-13 SDOH — ECONOMIC STABILITY: HOUSING INSECURITY
IN THE LAST 12 MONTHS, WAS THERE A TIME WHEN YOU DID NOT HAVE A STEADY PLACE TO SLEEP OR SLEPT IN A SHELTER (INCLUDING NOW)?: NO

## 2023-07-13 SDOH — ECONOMIC STABILITY: FOOD INSECURITY: WITHIN THE PAST 12 MONTHS, THE FOOD YOU BOUGHT JUST DIDN'T LAST AND YOU DIDN'T HAVE MONEY TO GET MORE.: OFTEN TRUE

## 2023-07-13 ASSESSMENT — ANXIETY QUESTIONNAIRES
4. TROUBLE RELAXING: 0
7. FEELING AFRAID AS IF SOMETHING AWFUL MIGHT HAPPEN: 0
6. BECOMING EASILY ANNOYED OR IRRITABLE: 0
IF YOU CHECKED OFF ANY PROBLEMS ON THIS QUESTIONNAIRE, HOW DIFFICULT HAVE THESE PROBLEMS MADE IT FOR YOU TO DO YOUR WORK, TAKE CARE OF THINGS AT HOME, OR GET ALONG WITH OTHER PEOPLE: NOT DIFFICULT AT ALL
2. NOT BEING ABLE TO STOP OR CONTROL WORRYING: 0
GAD7 TOTAL SCORE: 0
3. WORRYING TOO MUCH ABOUT DIFFERENT THINGS: 0
1. FEELING NERVOUS, ANXIOUS, OR ON EDGE: 0
5. BEING SO RESTLESS THAT IT IS HARD TO SIT STILL: 0

## 2023-07-13 ASSESSMENT — ENCOUNTER SYMPTOMS
SHORTNESS OF BREATH: 0
ABDOMINAL PAIN: 0
COUGH: 0
VOMITING: 0
NAUSEA: 0
DIARRHEA: 0

## 2023-07-13 ASSESSMENT — PATIENT HEALTH QUESTIONNAIRE - PHQ9
6. FEELING BAD ABOUT YOURSELF - OR THAT YOU ARE A FAILURE OR HAVE LET YOURSELF OR YOUR FAMILY DOWN: 0
10. IF YOU CHECKED OFF ANY PROBLEMS, HOW DIFFICULT HAVE THESE PROBLEMS MADE IT FOR YOU TO DO YOUR WORK, TAKE CARE OF THINGS AT HOME, OR GET ALONG WITH OTHER PEOPLE: 0
SUM OF ALL RESPONSES TO PHQ QUESTIONS 1-9: 0
8. MOVING OR SPEAKING SO SLOWLY THAT OTHER PEOPLE COULD HAVE NOTICED. OR THE OPPOSITE, BEING SO FIGETY OR RESTLESS THAT YOU HAVE BEEN MOVING AROUND A LOT MORE THAN USUAL: 0
2. FEELING DOWN, DEPRESSED OR HOPELESS: 0
SUM OF ALL RESPONSES TO PHQ QUESTIONS 1-9: 0
SUM OF ALL RESPONSES TO PHQ QUESTIONS 1-9: 0
5. POOR APPETITE OR OVEREATING: 0
9. THOUGHTS THAT YOU WOULD BE BETTER OFF DEAD, OR OF HURTING YOURSELF: 0
4. FEELING TIRED OR HAVING LITTLE ENERGY: 0
7. TROUBLE CONCENTRATING ON THINGS, SUCH AS READING THE NEWSPAPER OR WATCHING TELEVISION: 0
SUM OF ALL RESPONSES TO PHQ QUESTIONS 1-9: 0
3. TROUBLE FALLING OR STAYING ASLEEP: 0
SUM OF ALL RESPONSES TO PHQ9 QUESTIONS 1 & 2: 0
1. LITTLE INTEREST OR PLEASURE IN DOING THINGS: 0

## 2023-07-13 NOTE — PROGRESS NOTES
[address] [facility]  [address] [facility] [address]   Phone: [phone]      Niki Burrows (: 1975) is a 41 y.o. female, established patient, here for evaluation of the following chief complaint(s):  Discuss Labs, ED Follow-up, Follow-up Chronic Condition, and Vaginal Itching     SUBJECTIVE/OBJECTIVE:  HPI:  Patient is here for follow up, she did not have routine blood work for this visit. She did have some blood work with Dr. Melissa Fletcher. She was seen at [facility] ER on 23. She went for abdominal pain and possible UTI. She had urinalysis at the ER. She was given Tramadol, Fluconazole, and STD testing. She states that she  started having the vaginal discharge and itching. She states that the discharge is white in color but no odor. Patient stated them she has a hip pain right is worse than left, was given tramadol from ER and it did not help with the pain, denies any fall did not have eye exam recently. Is taking insulin, metformin and Mounjaro injection, did not see the diabetes doctor. Current Outpatient Medications   Medication Sig    fluconazole (DIFLUCAN) 150 MG tablet Take 1 tablet by mouth once for 1 dose    Tirzepatide (MOUNJARO) 5 MG/0.5ML SOPN SC injection Inject 0.5 mLs into the skin once a week    furosemide (LASIX) 40 MG tablet TAKE 1/2 (ONE-HALF) TABLET BY MOUTH ONCE DAILY AS NEEDED (NEEDS BLOOD WORK AND FOLLOW UP)    apixaban (ELIQUIS) 5 MG TABS tablet Take 1 tablet by mouth 2 times daily    cyclobenzaprine (FLEXERIL) 10 MG tablet Take 1 tablet by mouth 3 times daily as needed    fluticasone (FLONASE) 50 MCG/ACT nasal spray 2 sprays in each nostril daily.     insulin aspart (NOVOLOG) 100 UNIT/ML injection pen Inject 20 units before each meal    insulin glargine (LANTUS SOLOSTAR) 100 UNIT/ML injection pen Inject 40 units twice a day    ketoconazole (NIZORAL) 2 % shampoo Apply topically as needed    losartan (COZAAR) 50 MG tablet Take 1 tablet

## 2023-07-13 NOTE — PROGRESS NOTES
Chief Complaint   Patient presents with    Discuss Labs    ED Follow-up    Follow-up Chronic Condition    Vaginal Itching         1. \"Have you been to the ER, urgent care clinic since your last visit? Hospitalized since your last visit? \"  [facility] ED    2. \"Have you seen or consulted any other health care providers outside of the [address] [place] [address] Avenue since your last visit? \"  Dr. Diane Villa      3. For patients aged 43-73: Has the patient had a colonoscopy / FIT/ Cologuard? No      If the patient is female:    4. For patients aged 43-66: Has the patient had a mammogram within the past 2 years? Yes - Care Gap present. Most recent result on file      5. For patients aged 21-65: Has the patient had a pap smear? Yes - Care Gap present.  Most recent result on file

## 2023-07-15 LAB — BACTERIA UR CULT: NORMAL

## 2023-08-27 RX ORDER — INSULIN ASPART 100 [IU]/ML
INJECTION, SOLUTION INTRAVENOUS; SUBCUTANEOUS
Qty: 45 ML | Refills: 0 | Status: SHIPPED | OUTPATIENT
Start: 2023-08-27

## 2023-08-27 RX ORDER — INSULIN GLARGINE 100 [IU]/ML
INJECTION, SOLUTION SUBCUTANEOUS
Qty: 24 ML | Refills: 0 | Status: SHIPPED | OUTPATIENT
Start: 2023-08-27

## 2023-08-31 ENCOUNTER — TRANSCRIBE ORDERS (OUTPATIENT)
Facility: HOSPITAL | Age: 48
End: 2023-08-31

## 2023-08-31 DIAGNOSIS — Z12.31 SCREENING MAMMOGRAM FOR HIGH-RISK PATIENT: Primary | ICD-10-CM

## 2023-09-07 ENCOUNTER — HOSPITAL ENCOUNTER (OUTPATIENT)
Facility: HOSPITAL | Age: 48
Discharge: HOME | End: 2023-09-07
Attending: INTERNAL MEDICINE
Payer: MEDICARE

## 2023-09-07 VITALS — WEIGHT: 293 LBS | BODY MASS INDEX: 43.4 KG/M2 | HEIGHT: 69 IN

## 2023-09-07 DIAGNOSIS — Z12.31 SCREENING MAMMOGRAM FOR HIGH-RISK PATIENT: ICD-10-CM

## 2023-09-07 PROCEDURE — 77063 BREAST TOMOSYNTHESIS BI: CPT

## 2023-09-12 ENCOUNTER — HOSPITAL ENCOUNTER (OUTPATIENT)
Facility: HOSPITAL | Age: 48
Discharge: HOME | End: 2023-09-15
Payer: MEDICARE

## 2023-09-12 ENCOUNTER — OFFICE VISIT (OUTPATIENT)
Facility: CLINIC | Age: 48
End: 2023-09-12
Payer: MEDICARE

## 2023-09-12 VITALS
DIASTOLIC BLOOD PRESSURE: 90 MMHG | WEIGHT: 293 LBS | OXYGEN SATURATION: 98 % | TEMPERATURE: 98.8 F | HEIGHT: 69 IN | BODY MASS INDEX: 43.4 KG/M2 | SYSTOLIC BLOOD PRESSURE: 150 MMHG | HEART RATE: 90 BPM

## 2023-09-12 DIAGNOSIS — N89.8 VAGINAL ITCHING: ICD-10-CM

## 2023-09-12 DIAGNOSIS — E11.65 TYPE 2 DIABETES MELLITUS WITH HYPERGLYCEMIA, WITH LONG-TERM CURRENT USE OF INSULIN (HCC): ICD-10-CM

## 2023-09-12 DIAGNOSIS — M16.0 PRIMARY OSTEOARTHRITIS OF BOTH HIPS: ICD-10-CM

## 2023-09-12 DIAGNOSIS — Z79.4 TYPE 2 DIABETES MELLITUS WITH HYPERGLYCEMIA, WITH LONG-TERM CURRENT USE OF INSULIN (HCC): ICD-10-CM

## 2023-09-12 DIAGNOSIS — E78.2 MIXED HYPERLIPIDEMIA: ICD-10-CM

## 2023-09-12 DIAGNOSIS — Z79.4 TYPE 2 DIABETES MELLITUS WITH HYPERGLYCEMIA, WITH LONG-TERM CURRENT USE OF INSULIN (HCC): Primary | ICD-10-CM

## 2023-09-12 DIAGNOSIS — I10 ESSENTIAL HYPERTENSION, BENIGN: ICD-10-CM

## 2023-09-12 DIAGNOSIS — E11.65 TYPE 2 DIABETES MELLITUS WITH HYPERGLYCEMIA, WITH LONG-TERM CURRENT USE OF INSULIN (HCC): Primary | ICD-10-CM

## 2023-09-12 DIAGNOSIS — L21.9 SEBORRHEIC DERMATITIS: ICD-10-CM

## 2023-09-12 DIAGNOSIS — Z85.42 HISTORY OF ENDOMETRIAL CANCER: ICD-10-CM

## 2023-09-12 LAB
ALBUMIN SERPL-MCNC: 4.2 G/DL (ref 3.9–4.9)
ALBUMIN/GLOB SERPL: 1.1 {RATIO} (ref 1.2–2.2)
ALP SERPL-CCNC: 106 IU/L (ref 44–121)
ALT SERPL-CCNC: 20 IU/L (ref 0–32)
AST SERPL-CCNC: 24 IU/L (ref 0–40)
BASOPHILS # BLD AUTO: 0.1 X10E3/UL (ref 0–0.2)
BASOPHILS NFR BLD AUTO: 1 %
BILIRUB SERPL-MCNC: 0.3 MG/DL (ref 0–1.2)
BUN SERPL-MCNC: 8 MG/DL (ref 6–24)
BUN/CREAT SERPL: 11 (ref 9–23)
CALCIUM SERPL-MCNC: 9.1 MG/DL (ref 8.7–10.2)
CHLORIDE SERPL-SCNC: 95 MMOL/L (ref 96–106)
CHOLEST SERPL-MCNC: 226 MG/DL (ref 100–199)
CO2 SERPL-SCNC: 23 MMOL/L (ref 20–29)
CREAT SERPL-MCNC: 0.71 MG/DL (ref 0.57–1)
EGFRCR SERPLBLD CKD-EPI 2021: 107 ML/MIN/1.73
EOSINOPHIL # BLD AUTO: 0.1 X10E3/UL (ref 0–0.4)
EOSINOPHIL NFR BLD AUTO: 1 %
ERYTHROCYTE [DISTWIDTH] IN BLOOD BY AUTOMATED COUNT: 13.5 % (ref 11.7–15.4)
GLOBULIN SER CALC-MCNC: 3.8 G/DL (ref 1.5–4.5)
GLUCOSE SERPL-MCNC: 314 MG/DL (ref 70–99)
HBA1C MFR BLD: 13.6 % (ref 4.8–5.6)
HCT VFR BLD AUTO: 39.9 % (ref 34–46.6)
HDLC SERPL-MCNC: 56 MG/DL
HGB BLD-MCNC: 13.2 G/DL (ref 11.1–15.9)
IMM GRANULOCYTES # BLD AUTO: 0 X10E3/UL (ref 0–0.1)
IMM GRANULOCYTES NFR BLD AUTO: 0 %
LDLC SERPL CALC-MCNC: 133 MG/DL (ref 0–99)
LYMPHOCYTES # BLD AUTO: 3.2 X10E3/UL (ref 0.7–3.1)
LYMPHOCYTES NFR BLD AUTO: 33 %
MCH RBC QN AUTO: 29.1 PG (ref 26.6–33)
MCHC RBC AUTO-ENTMCNC: 33.1 G/DL (ref 31.5–35.7)
MCV RBC AUTO: 88 FL (ref 79–97)
MONOCYTES # BLD AUTO: 0.6 X10E3/UL (ref 0.1–0.9)
MONOCYTES NFR BLD AUTO: 6 %
NEUTROPHILS # BLD AUTO: 5.7 X10E3/UL (ref 1.4–7)
NEUTROPHILS NFR BLD AUTO: 59 %
PLATELET # BLD AUTO: 291 X10E3/UL (ref 150–450)
POTASSIUM SERPL-SCNC: 4.1 MMOL/L (ref 3.5–5.2)
PROT SERPL-MCNC: 8 G/DL (ref 6–8.5)
RBC # BLD AUTO: 4.54 X10E6/UL (ref 3.77–5.28)
SODIUM SERPL-SCNC: 137 MMOL/L (ref 134–144)
TRIGL SERPL-MCNC: 211 MG/DL (ref 0–149)
TSH SERPL DL<=0.005 MIU/L-ACNC: 1.5 UIU/ML (ref 0.45–4.5)
VLDLC SERPL CALC-MCNC: 37 MG/DL (ref 5–40)
WBC # BLD AUTO: 9.7 X10E3/UL (ref 3.4–10.8)

## 2023-09-12 PROCEDURE — 2022F DILAT RTA XM EVC RTNOPTHY: CPT | Performed by: INTERNAL MEDICINE

## 2023-09-12 PROCEDURE — G8427 DOCREV CUR MEDS BY ELIG CLIN: HCPCS | Performed by: INTERNAL MEDICINE

## 2023-09-12 PROCEDURE — 1036F TOBACCO NON-USER: CPT | Performed by: INTERNAL MEDICINE

## 2023-09-12 PROCEDURE — 3078F DIAST BP <80 MM HG: CPT | Performed by: INTERNAL MEDICINE

## 2023-09-12 PROCEDURE — 99214 OFFICE O/P EST MOD 30 MIN: CPT | Performed by: INTERNAL MEDICINE

## 2023-09-12 PROCEDURE — 3077F SYST BP >= 140 MM HG: CPT | Performed by: INTERNAL MEDICINE

## 2023-09-12 PROCEDURE — 73521 X-RAY EXAM HIPS BI 2 VIEWS: CPT

## 2023-09-12 PROCEDURE — G8417 CALC BMI ABV UP PARAM F/U: HCPCS | Performed by: INTERNAL MEDICINE

## 2023-09-12 PROCEDURE — 3046F HEMOGLOBIN A1C LEVEL >9.0%: CPT | Performed by: INTERNAL MEDICINE

## 2023-09-12 RX ORDER — INSULIN GLARGINE 100 [IU]/ML
48 INJECTION, SOLUTION SUBCUTANEOUS NIGHTLY
Qty: 46 ML | Refills: 0 | Status: SHIPPED | OUTPATIENT
Start: 2023-09-12

## 2023-09-12 RX ORDER — FLUCONAZOLE 150 MG/1
150 TABLET ORAL ONCE
Qty: 1 TABLET | Refills: 0 | Status: SHIPPED | OUTPATIENT
Start: 2023-09-12 | End: 2023-09-12

## 2023-09-12 RX ORDER — ROSUVASTATIN CALCIUM 20 MG/1
20 TABLET, COATED ORAL NIGHTLY
Qty: 90 TABLET | Refills: 1 | Status: SHIPPED | OUTPATIENT
Start: 2023-09-12

## 2023-09-12 RX ORDER — LOSARTAN POTASSIUM 100 MG/1
100 TABLET ORAL DAILY
Qty: 90 TABLET | Refills: 1 | Status: SHIPPED | OUTPATIENT
Start: 2023-09-12

## 2023-09-12 RX ORDER — KETOCONAZOLE 20 MG/G
CREAM TOPICAL
Qty: 30 G | Refills: 1 | Status: SHIPPED | OUTPATIENT
Start: 2023-09-12

## 2023-09-12 NOTE — PROGRESS NOTES
[address] [facility] Internal Medicine  [address] [facility] [address] Luis, [address]  Phone: [phone]      Pretty Wade (: 1975) is a 41 y.o. female, established patient, here for evaluation of the following chief complaint(s):  Discuss Labs, Vaginal Itching, and Follow-up Chronic Condition     SUBJECTIVE/OBJECTIVE:  HPI:  Patient is here for follow up, she recently had blood work. She states that she had her B/L hip x-rays today at [facility]. She went to see Dr. Stefania Martinez, she told her about the vaginal itching and white discharge. She thinks it is related to her elevated glucose. She would like to have something to help the itching. She is checking her glucose 3 times daily, some of her blood sugar ranges 200's-400's. She denies any episodes of low blood sugar and she needs to schedule her annual eye exam for . She needs a refill on Losartan, Crestor, and Triamcinolone Ointment, she needs a larger size tube because she has to use it all over. Patient stated she just took her losartan and has not been taking it daily. 23 Hemo 13.2, Hema 39.9, Glu 314, Creat 0.71, K+ 4.1, Chol T 226, Trig 211, HDL 56, , A1c 13.6, TSH 1.50. Current Outpatient Medications   Medication Sig    triamcinolone (KENALOG) 0.1 % ointment Apply topically daily as needed (rash)    losartan (COZAAR) 100 MG tablet Take 1 tablet by mouth daily    rosuvastatin (CRESTOR) 20 MG tablet Take 1 tablet by mouth nightly    insulin glargine (LANTUS SOLOSTAR) 100 UNIT/ML injection pen Inject 48 Units into the skin nightly    fluconazole (DIFLUCAN) 150 MG tablet Take 1 tablet by mouth once for 1 dose    ketoconazole (NIZORAL) 2 % cream Apply topically daily.     NOVOLOG FLEXPEN 100 UNIT/ML injection pen INJECT 15 UNITS VIA SUBCUTANEOUS ROUTE THREE TIMES DAILY BEFORE MEALS    Tirzepatide (MOUNJARO) 5 MG/0.5ML SOPN SC injection Inject 0.5 mLs into the skin once a week    furosemide (LASIX) 40 MG tablet TAKE 1/2

## 2023-09-29 ENCOUNTER — OFFICE VISIT (OUTPATIENT)
Age: 48
End: 2023-09-29
Payer: MEDICARE

## 2023-09-29 VITALS
BODY MASS INDEX: 43.4 KG/M2 | TEMPERATURE: 98.2 F | OXYGEN SATURATION: 98 % | HEIGHT: 69 IN | RESPIRATION RATE: 22 BRPM | SYSTOLIC BLOOD PRESSURE: 131 MMHG | HEART RATE: 91 BPM | WEIGHT: 293 LBS | DIASTOLIC BLOOD PRESSURE: 87 MMHG

## 2023-09-29 DIAGNOSIS — E11.49 TYPE 2 DIABETES MELLITUS WITH OTHER DIABETIC NEUROLOGICAL COMPLICATION (HCC): Primary | ICD-10-CM

## 2023-09-29 DIAGNOSIS — E78.2 MIXED HYPERLIPIDEMIA: ICD-10-CM

## 2023-09-29 DIAGNOSIS — I10 ESSENTIAL HYPERTENSION: ICD-10-CM

## 2023-09-29 DIAGNOSIS — R80.9 MICROALBUMINURIA: ICD-10-CM

## 2023-09-29 DIAGNOSIS — E66.01 MORBID OBESITY DUE TO EXCESS CALORIES (HCC): ICD-10-CM

## 2023-09-29 LAB — GLUCOSE, POC: 314 MG/DL

## 2023-09-29 PROCEDURE — G8427 DOCREV CUR MEDS BY ELIG CLIN: HCPCS | Performed by: STUDENT IN AN ORGANIZED HEALTH CARE EDUCATION/TRAINING PROGRAM

## 2023-09-29 PROCEDURE — 3079F DIAST BP 80-89 MM HG: CPT | Performed by: STUDENT IN AN ORGANIZED HEALTH CARE EDUCATION/TRAINING PROGRAM

## 2023-09-29 PROCEDURE — G8417 CALC BMI ABV UP PARAM F/U: HCPCS | Performed by: STUDENT IN AN ORGANIZED HEALTH CARE EDUCATION/TRAINING PROGRAM

## 2023-09-29 PROCEDURE — 3046F HEMOGLOBIN A1C LEVEL >9.0%: CPT | Performed by: STUDENT IN AN ORGANIZED HEALTH CARE EDUCATION/TRAINING PROGRAM

## 2023-09-29 PROCEDURE — 99214 OFFICE O/P EST MOD 30 MIN: CPT | Performed by: STUDENT IN AN ORGANIZED HEALTH CARE EDUCATION/TRAINING PROGRAM

## 2023-09-29 PROCEDURE — 3075F SYST BP GE 130 - 139MM HG: CPT | Performed by: STUDENT IN AN ORGANIZED HEALTH CARE EDUCATION/TRAINING PROGRAM

## 2023-09-29 PROCEDURE — 1036F TOBACCO NON-USER: CPT | Performed by: STUDENT IN AN ORGANIZED HEALTH CARE EDUCATION/TRAINING PROGRAM

## 2023-09-29 PROCEDURE — 2022F DILAT RTA XM EVC RTNOPTHY: CPT | Performed by: STUDENT IN AN ORGANIZED HEALTH CARE EDUCATION/TRAINING PROGRAM

## 2023-09-29 PROCEDURE — 82962 GLUCOSE BLOOD TEST: CPT | Performed by: STUDENT IN AN ORGANIZED HEALTH CARE EDUCATION/TRAINING PROGRAM

## 2023-09-29 RX ORDER — FLUCONAZOLE 150 MG/1
TABLET ORAL
COMMUNITY
Start: 2023-09-13 | End: 2023-09-29

## 2023-09-29 RX ORDER — ACYCLOVIR 400 MG/1
TABLET ORAL
Qty: 10 EACH | Refills: 1 | Status: SHIPPED | OUTPATIENT
Start: 2023-09-29

## 2023-09-29 NOTE — PATIENT INSTRUCTIONS
1.Please eliminate  the sodas   2.  Increase the long acting (glargine = lantus) 58 units   Continue novolog to 20 units with meals   Continue metformin 2 tabs twice a day   Download Dexcom G7 livier on your phone   Start mounjaro - watch youtube video

## 2023-10-20 ENCOUNTER — OFFICE VISIT (OUTPATIENT)
Age: 48
End: 2023-10-20
Payer: MEDICARE

## 2023-10-20 VITALS
DIASTOLIC BLOOD PRESSURE: 76 MMHG | BODY MASS INDEX: 43.4 KG/M2 | HEART RATE: 90 BPM | OXYGEN SATURATION: 98 % | WEIGHT: 293 LBS | TEMPERATURE: 98.3 F | RESPIRATION RATE: 20 BRPM | HEIGHT: 69 IN | SYSTOLIC BLOOD PRESSURE: 133 MMHG

## 2023-10-20 DIAGNOSIS — Z79.4 TYPE 2 DIABETES MELLITUS WITH HYPERGLYCEMIA, WITH LONG-TERM CURRENT USE OF INSULIN (HCC): ICD-10-CM

## 2023-10-20 DIAGNOSIS — E11.65 TYPE 2 DIABETES MELLITUS WITH HYPERGLYCEMIA, WITH LONG-TERM CURRENT USE OF INSULIN (HCC): ICD-10-CM

## 2023-10-20 DIAGNOSIS — E11.49 TYPE 2 DIABETES MELLITUS WITH OTHER DIABETIC NEUROLOGICAL COMPLICATION (HCC): Primary | ICD-10-CM

## 2023-10-20 DIAGNOSIS — I10 ESSENTIAL HYPERTENSION, BENIGN: ICD-10-CM

## 2023-10-20 LAB — GLUCOSE, POC: 258 MG/DL

## 2023-10-20 PROCEDURE — 82962 GLUCOSE BLOOD TEST: CPT | Performed by: STUDENT IN AN ORGANIZED HEALTH CARE EDUCATION/TRAINING PROGRAM

## 2023-10-20 PROCEDURE — 3078F DIAST BP <80 MM HG: CPT | Performed by: STUDENT IN AN ORGANIZED HEALTH CARE EDUCATION/TRAINING PROGRAM

## 2023-10-20 PROCEDURE — 3075F SYST BP GE 130 - 139MM HG: CPT | Performed by: STUDENT IN AN ORGANIZED HEALTH CARE EDUCATION/TRAINING PROGRAM

## 2023-10-20 PROCEDURE — 2022F DILAT RTA XM EVC RTNOPTHY: CPT | Performed by: STUDENT IN AN ORGANIZED HEALTH CARE EDUCATION/TRAINING PROGRAM

## 2023-10-20 PROCEDURE — G8484 FLU IMMUNIZE NO ADMIN: HCPCS | Performed by: STUDENT IN AN ORGANIZED HEALTH CARE EDUCATION/TRAINING PROGRAM

## 2023-10-20 PROCEDURE — 3046F HEMOGLOBIN A1C LEVEL >9.0%: CPT | Performed by: STUDENT IN AN ORGANIZED HEALTH CARE EDUCATION/TRAINING PROGRAM

## 2023-10-20 PROCEDURE — 99214 OFFICE O/P EST MOD 30 MIN: CPT | Performed by: STUDENT IN AN ORGANIZED HEALTH CARE EDUCATION/TRAINING PROGRAM

## 2023-10-20 PROCEDURE — 1036F TOBACCO NON-USER: CPT | Performed by: STUDENT IN AN ORGANIZED HEALTH CARE EDUCATION/TRAINING PROGRAM

## 2023-10-20 PROCEDURE — G8417 CALC BMI ABV UP PARAM F/U: HCPCS | Performed by: STUDENT IN AN ORGANIZED HEALTH CARE EDUCATION/TRAINING PROGRAM

## 2023-10-20 PROCEDURE — G8427 DOCREV CUR MEDS BY ELIG CLIN: HCPCS | Performed by: STUDENT IN AN ORGANIZED HEALTH CARE EDUCATION/TRAINING PROGRAM

## 2023-10-20 RX ORDER — FLUCONAZOLE 150 MG/1
TABLET ORAL
Qty: 2 TABLET | Refills: 0 | Status: SHIPPED | OUTPATIENT
Start: 2023-10-20

## 2023-10-20 RX ORDER — INSULIN ASPART 100 [IU]/ML
INJECTION, SOLUTION INTRAVENOUS; SUBCUTANEOUS
Qty: 45 ML | Refills: 0 | Status: SHIPPED | OUTPATIENT
Start: 2023-10-20

## 2023-10-20 RX ORDER — TRAMADOL HYDROCHLORIDE 50 MG/1
TABLET ORAL
COMMUNITY
Start: 2023-10-06

## 2023-10-20 RX ORDER — INSULIN GLARGINE 100 [IU]/ML
48 INJECTION, SOLUTION SUBCUTANEOUS NIGHTLY
Qty: 46 ML | Refills: 0 | Status: SHIPPED | OUTPATIENT
Start: 2023-10-20

## 2023-10-20 NOTE — PATIENT INSTRUCTIONS
Start Moujaro 5 mg /week   Refilled insulin  Prescription sent for yeast infection  DME company will call you for the sensor

## 2023-11-02 RX ORDER — OMEPRAZOLE 40 MG/1
40 CAPSULE, DELAYED RELEASE ORAL DAILY
Qty: 90 CAPSULE | Refills: 0 | Status: SHIPPED | OUTPATIENT
Start: 2023-11-02 | End: 2023-11-28

## 2023-11-02 RX ORDER — ROSUVASTATIN CALCIUM 20 MG/1
20 TABLET, COATED ORAL NIGHTLY
Qty: 90 TABLET | Refills: 1 | Status: SHIPPED | OUTPATIENT
Start: 2023-11-02

## 2023-11-07 RX ORDER — OMEPRAZOLE 40 MG/1
40 CAPSULE, DELAYED RELEASE ORAL DAILY
Qty: 90 CAPSULE | Refills: 0 | OUTPATIENT
Start: 2023-11-07

## 2023-11-07 RX ORDER — ROSUVASTATIN CALCIUM 10 MG/1
10 TABLET, COATED ORAL
Qty: 90 TABLET | Refills: 0 | OUTPATIENT
Start: 2023-11-07

## 2023-11-07 RX ORDER — FUROSEMIDE 40 MG/1
TABLET ORAL
Qty: 15 TABLET | Refills: 0 | OUTPATIENT
Start: 2023-11-07

## 2023-11-28 RX ORDER — FUROSEMIDE 40 MG/1
TABLET ORAL
Qty: 15 TABLET | Refills: 0 | Status: SHIPPED | OUTPATIENT
Start: 2023-11-28

## 2023-11-28 RX ORDER — OMEPRAZOLE 40 MG/1
40 CAPSULE, DELAYED RELEASE ORAL DAILY
Qty: 90 CAPSULE | Refills: 0 | Status: SHIPPED | OUTPATIENT
Start: 2023-11-28

## 2023-11-29 ENCOUNTER — TRANSCRIBE ORDERS (OUTPATIENT)
Facility: HOSPITAL | Age: 48
End: 2023-11-29

## 2023-11-29 DIAGNOSIS — R22.41 LOCALIZED SWELLING, MASS, OR LUMP OF LOWER EXTREMITY, RIGHT: Primary | ICD-10-CM

## 2023-11-29 DIAGNOSIS — M79.604 PAIN OF RIGHT LEG: ICD-10-CM

## 2023-11-30 ENCOUNTER — HOSPITAL ENCOUNTER (OUTPATIENT)
Facility: HOSPITAL | Age: 48
Discharge: HOME | End: 2023-12-02
Attending: INTERNAL MEDICINE
Payer: MEDICARE

## 2023-11-30 DIAGNOSIS — M79.604 PAIN OF RIGHT LEG: ICD-10-CM

## 2023-11-30 DIAGNOSIS — R22.41 LOCALIZED SWELLING, MASS, OR LUMP OF LOWER EXTREMITY, RIGHT: ICD-10-CM

## 2023-11-30 PROCEDURE — 93971 EXTREMITY STUDY: CPT

## 2023-12-01 DIAGNOSIS — E11.65 TYPE 2 DIABETES MELLITUS WITH HYPERGLYCEMIA, WITH LONG-TERM CURRENT USE OF INSULIN (HCC): ICD-10-CM

## 2023-12-01 DIAGNOSIS — Z79.4 TYPE 2 DIABETES MELLITUS WITH HYPERGLYCEMIA, WITH LONG-TERM CURRENT USE OF INSULIN (HCC): ICD-10-CM

## 2023-12-01 RX ORDER — INSULIN GLARGINE 100 [IU]/ML
48 INJECTION, SOLUTION SUBCUTANEOUS NIGHTLY
Qty: 46 ML | Refills: 0 | Status: SHIPPED | OUTPATIENT
Start: 2023-12-01

## 2023-12-01 NOTE — TELEPHONE ENCOUNTER
Requested Prescriptions     Pending Prescriptions Disp Refills    insulin glargine (LANTUS SOLOSTAR) 100 UNIT/ML injection pen 46 mL 0     Sig: Inject 48 Units into the skin nightly Inject 58 units at night      Last OV: 10/20/23  Next OV: 12/18/23  Last Refill: 10/22/23    Refill request via fax

## 2024-01-09 ENCOUNTER — OFFICE VISIT (OUTPATIENT)
Facility: CLINIC | Age: 49
End: 2024-01-09
Payer: MEDICARE

## 2024-01-09 VITALS
DIASTOLIC BLOOD PRESSURE: 88 MMHG | TEMPERATURE: 98.3 F | OXYGEN SATURATION: 98 % | HEART RATE: 109 BPM | WEIGHT: 293 LBS | HEIGHT: 69 IN | SYSTOLIC BLOOD PRESSURE: 148 MMHG | BODY MASS INDEX: 43.4 KG/M2

## 2024-01-09 DIAGNOSIS — E11.65 TYPE 2 DIABETES MELLITUS WITH HYPERGLYCEMIA, WITH LONG-TERM CURRENT USE OF INSULIN (HCC): ICD-10-CM

## 2024-01-09 DIAGNOSIS — L21.9 SEBORRHEIC DERMATITIS: ICD-10-CM

## 2024-01-09 DIAGNOSIS — I10 ESSENTIAL HYPERTENSION, BENIGN: ICD-10-CM

## 2024-01-09 DIAGNOSIS — E78.2 MIXED HYPERLIPIDEMIA: ICD-10-CM

## 2024-01-09 DIAGNOSIS — Z86.711 HISTORY OF PULMONARY EMBOLISM: ICD-10-CM

## 2024-01-09 DIAGNOSIS — Z12.11 COLON CANCER SCREENING: ICD-10-CM

## 2024-01-09 DIAGNOSIS — I10 ESSENTIAL HYPERTENSION, BENIGN: Primary | ICD-10-CM

## 2024-01-09 DIAGNOSIS — Z79.4 TYPE 2 DIABETES MELLITUS WITH HYPERGLYCEMIA, WITH LONG-TERM CURRENT USE OF INSULIN (HCC): ICD-10-CM

## 2024-01-09 PROCEDURE — 3079F DIAST BP 80-89 MM HG: CPT | Performed by: INTERNAL MEDICINE

## 2024-01-09 PROCEDURE — 99214 OFFICE O/P EST MOD 30 MIN: CPT | Performed by: INTERNAL MEDICINE

## 2024-01-09 PROCEDURE — 3077F SYST BP >= 140 MM HG: CPT | Performed by: INTERNAL MEDICINE

## 2024-01-09 PROCEDURE — 1036F TOBACCO NON-USER: CPT | Performed by: INTERNAL MEDICINE

## 2024-01-09 PROCEDURE — 2022F DILAT RTA XM EVC RTNOPTHY: CPT | Performed by: INTERNAL MEDICINE

## 2024-01-09 PROCEDURE — G8427 DOCREV CUR MEDS BY ELIG CLIN: HCPCS | Performed by: INTERNAL MEDICINE

## 2024-01-09 PROCEDURE — 3046F HEMOGLOBIN A1C LEVEL >9.0%: CPT | Performed by: INTERNAL MEDICINE

## 2024-01-09 PROCEDURE — G8484 FLU IMMUNIZE NO ADMIN: HCPCS | Performed by: INTERNAL MEDICINE

## 2024-01-09 PROCEDURE — G8417 CALC BMI ABV UP PARAM F/U: HCPCS | Performed by: INTERNAL MEDICINE

## 2024-01-09 RX ORDER — LOSARTAN POTASSIUM 100 MG/1
100 TABLET ORAL DAILY
Qty: 90 TABLET | Refills: 1 | Status: SHIPPED | OUTPATIENT
Start: 2024-01-09

## 2024-01-09 RX ORDER — FUROSEMIDE 20 MG/1
20 TABLET ORAL DAILY PRN
Qty: 90 TABLET | Refills: 1 | Status: SHIPPED | OUTPATIENT
Start: 2024-01-09

## 2024-01-09 RX ORDER — PREDNISONE 10 MG/1
10 TABLET ORAL DAILY
COMMUNITY
Start: 2024-01-08

## 2024-01-09 RX ORDER — FUROSEMIDE 40 MG/1
TABLET ORAL
Qty: 15 TABLET | Refills: 0 | Status: CANCELLED | OUTPATIENT
Start: 2024-01-09

## 2024-01-09 RX ORDER — OMEPRAZOLE 40 MG/1
40 CAPSULE, DELAYED RELEASE ORAL DAILY
Qty: 90 CAPSULE | Refills: 0 | Status: SHIPPED | OUTPATIENT
Start: 2024-01-09

## 2024-01-09 RX ORDER — KETOCONAZOLE 20 MG/G
CREAM TOPICAL
Qty: 30 G | Refills: 1 | Status: SHIPPED | OUTPATIENT
Start: 2024-01-09

## 2024-01-09 RX ORDER — METOPROLOL SUCCINATE 25 MG/1
25 TABLET, EXTENDED RELEASE ORAL DAILY
Qty: 90 TABLET | Refills: 1 | Status: SHIPPED | OUTPATIENT
Start: 2024-01-09

## 2024-01-09 RX ORDER — INSULIN ASPART 100 [IU]/ML
INJECTION, SOLUTION INTRAVENOUS; SUBCUTANEOUS
Qty: 45 ML | Refills: 0 | Status: CANCELLED | OUTPATIENT
Start: 2024-01-09

## 2024-01-09 RX ORDER — ROSUVASTATIN CALCIUM 20 MG/1
20 TABLET, COATED ORAL NIGHTLY
Qty: 90 TABLET | Refills: 1 | Status: SHIPPED | OUTPATIENT
Start: 2024-01-09

## 2024-01-09 RX ORDER — INSULIN GLARGINE 100 [IU]/ML
48 INJECTION, SOLUTION SUBCUTANEOUS NIGHTLY
Qty: 46 ML | Refills: 0 | Status: CANCELLED | OUTPATIENT
Start: 2024-01-09

## 2024-01-09 ASSESSMENT — PATIENT HEALTH QUESTIONNAIRE - PHQ9
1. LITTLE INTEREST OR PLEASURE IN DOING THINGS: 0
4. FEELING TIRED OR HAVING LITTLE ENERGY: 0
SUM OF ALL RESPONSES TO PHQ QUESTIONS 1-9: 0
2. FEELING DOWN, DEPRESSED OR HOPELESS: 0
8. MOVING OR SPEAKING SO SLOWLY THAT OTHER PEOPLE COULD HAVE NOTICED. OR THE OPPOSITE, BEING SO FIGETY OR RESTLESS THAT YOU HAVE BEEN MOVING AROUND A LOT MORE THAN USUAL: 0
10. IF YOU CHECKED OFF ANY PROBLEMS, HOW DIFFICULT HAVE THESE PROBLEMS MADE IT FOR YOU TO DO YOUR WORK, TAKE CARE OF THINGS AT HOME, OR GET ALONG WITH OTHER PEOPLE: 0
SUM OF ALL RESPONSES TO PHQ QUESTIONS 1-9: 0
6. FEELING BAD ABOUT YOURSELF - OR THAT YOU ARE A FAILURE OR HAVE LET YOURSELF OR YOUR FAMILY DOWN: 0
SUM OF ALL RESPONSES TO PHQ9 QUESTIONS 1 & 2: 0
3. TROUBLE FALLING OR STAYING ASLEEP: 0
SUM OF ALL RESPONSES TO PHQ QUESTIONS 1-9: 0
7. TROUBLE CONCENTRATING ON THINGS, SUCH AS READING THE NEWSPAPER OR WATCHING TELEVISION: 0
9. THOUGHTS THAT YOU WOULD BE BETTER OFF DEAD, OR OF HURTING YOURSELF: 0
SUM OF ALL RESPONSES TO PHQ QUESTIONS 1-9: 0
5. POOR APPETITE OR OVEREATING: 0

## 2024-01-09 NOTE — PROGRESS NOTES
No chief complaint on file.        1. \"Have you been to the ER, urgent care clinic since your last visit?  Hospitalized since your last visit?\" No    2. \"Have you seen or consulted any other health care providers outside of the [facility] since your last visit?\" No     3. For patients aged 45-75: Has the patient had a colonoscopy / FIT/ Cologuard? No      If the patient is female:    4. For patients aged 40-74: Has the patient had a mammogram within the past 2 years? Yes - Care Gap present. Most recent result on file      5. For patients aged 21-65: Has the patient had a pap smear? Yes - Care Gap present. Most recent result on file

## 2024-01-09 NOTE — PROGRESS NOTES
[facility]  [address], [place] [address]  Phone: [phone]      Indigo Good (: 1975) is a 48 y.o. female, established patient, here for evaluation of the following chief complaint(s):  Follow-up Chronic Condition     SUBJECTIVE/OBJECTIVE:  HPI:  Patient is here for follow up, she did not have routine blood work for this visit. She checks her blood sugar 3 times daily; her ranges 129-300's. She denies any episodes of low blood sugar and she had her annual eye exam in October at [facility]. She needed a new prescription for glasses, by [facility].  Saw  diabetes Dr. Montana  and has been started on Mounjaro for her diabetes and is taking it since November.  Patient stated she takes Lasix most of the days for leg swelling.  Swelling is more on the right leg, had venous Doppler recently in November and it was negative.  She needs refills on medicines.  Patient stated she is on prednisone 10 mg daily and tramadol 50 mg daily as needed for right hip pain pain from Dr. Crabtree    Hemoglobin A1C   Date Value Ref Range Status   2023 13.6 (H) 4.8 - 5.6 % Final     Comment:              Prediabetes: 5.7 - 6.4           Diabetes: >6.4           Glycemic control for adults with diabetes: <7.0        Hemoglobin   Date Value Ref Range Status   2023 13.2 11.1 - 15.9 g/dL Final     Hematocrit   Date Value Ref Range Status   2023 39.9 34.0 - 46.6 % Final     Creatinine   Date Value Ref Range Status   2023 0.71 0.57 - 1.00 mg/dL Final     Glucose   Date Value Ref Range Status   2023 314 (H) 70 - 99 mg/dL Final     TSH   Date Value Ref Range Status   2023 1.500 0.450 - 4.500 uIU/mL Final     Potassium   Date Value Ref Range Status   2023 4.1 3.5 - 5.2 mmol/L Final     Cholesterol, Total   Date Value Ref Range Status   2023 371 (H) 100 - 199 mg/dL Final     Cholesterol   Date Value Ref Range Status   2023 226 (H) 100

## 2024-03-22 ENCOUNTER — OFFICE VISIT (OUTPATIENT)
Age: 49
End: 2024-03-22
Payer: MEDICARE

## 2024-03-22 VITALS
OXYGEN SATURATION: 98 % | BODY MASS INDEX: 43.4 KG/M2 | SYSTOLIC BLOOD PRESSURE: 133 MMHG | HEIGHT: 69 IN | WEIGHT: 293 LBS | HEART RATE: 97 BPM | DIASTOLIC BLOOD PRESSURE: 85 MMHG | RESPIRATION RATE: 18 BRPM | TEMPERATURE: 98.4 F

## 2024-03-22 DIAGNOSIS — R80.9 MICROALBUMINURIA: ICD-10-CM

## 2024-03-22 DIAGNOSIS — E11.65 TYPE 2 DIABETES MELLITUS WITH HYPERGLYCEMIA, WITH LONG-TERM CURRENT USE OF INSULIN (HCC): ICD-10-CM

## 2024-03-22 DIAGNOSIS — E11.65 TYPE 2 DIABETES MELLITUS WITH HYPERGLYCEMIA, WITH LONG-TERM CURRENT USE OF INSULIN (HCC): Primary | ICD-10-CM

## 2024-03-22 DIAGNOSIS — Z79.4 TYPE 2 DIABETES MELLITUS WITH HYPERGLYCEMIA, WITH LONG-TERM CURRENT USE OF INSULIN (HCC): ICD-10-CM

## 2024-03-22 DIAGNOSIS — E78.2 MIXED HYPERLIPIDEMIA: ICD-10-CM

## 2024-03-22 DIAGNOSIS — I10 ESSENTIAL HYPERTENSION: ICD-10-CM

## 2024-03-22 DIAGNOSIS — Z79.4 TYPE 2 DIABETES MELLITUS WITH HYPERGLYCEMIA, WITH LONG-TERM CURRENT USE OF INSULIN (HCC): Primary | ICD-10-CM

## 2024-03-22 PROBLEM — E11.49 TYPE 2 DIABETES MELLITUS WITH OTHER DIABETIC NEUROLOGICAL COMPLICATION (HCC): Status: ACTIVE | Noted: 2017-08-25

## 2024-03-22 LAB
GLUCOSE, POC: 307 MG/DL
HBA1C MFR BLD: 11.7 %

## 2024-03-22 PROCEDURE — 3046F HEMOGLOBIN A1C LEVEL >9.0%: CPT | Performed by: STUDENT IN AN ORGANIZED HEALTH CARE EDUCATION/TRAINING PROGRAM

## 2024-03-22 PROCEDURE — G8417 CALC BMI ABV UP PARAM F/U: HCPCS | Performed by: STUDENT IN AN ORGANIZED HEALTH CARE EDUCATION/TRAINING PROGRAM

## 2024-03-22 PROCEDURE — 1036F TOBACCO NON-USER: CPT | Performed by: STUDENT IN AN ORGANIZED HEALTH CARE EDUCATION/TRAINING PROGRAM

## 2024-03-22 PROCEDURE — G8427 DOCREV CUR MEDS BY ELIG CLIN: HCPCS | Performed by: STUDENT IN AN ORGANIZED HEALTH CARE EDUCATION/TRAINING PROGRAM

## 2024-03-22 PROCEDURE — 2022F DILAT RTA XM EVC RTNOPTHY: CPT | Performed by: STUDENT IN AN ORGANIZED HEALTH CARE EDUCATION/TRAINING PROGRAM

## 2024-03-22 PROCEDURE — 3079F DIAST BP 80-89 MM HG: CPT | Performed by: STUDENT IN AN ORGANIZED HEALTH CARE EDUCATION/TRAINING PROGRAM

## 2024-03-22 PROCEDURE — G8484 FLU IMMUNIZE NO ADMIN: HCPCS | Performed by: STUDENT IN AN ORGANIZED HEALTH CARE EDUCATION/TRAINING PROGRAM

## 2024-03-22 PROCEDURE — 82962 GLUCOSE BLOOD TEST: CPT | Performed by: STUDENT IN AN ORGANIZED HEALTH CARE EDUCATION/TRAINING PROGRAM

## 2024-03-22 PROCEDURE — 99213 OFFICE O/P EST LOW 20 MIN: CPT | Performed by: STUDENT IN AN ORGANIZED HEALTH CARE EDUCATION/TRAINING PROGRAM

## 2024-03-22 PROCEDURE — 3075F SYST BP GE 130 - 139MM HG: CPT | Performed by: STUDENT IN AN ORGANIZED HEALTH CARE EDUCATION/TRAINING PROGRAM

## 2024-03-22 PROCEDURE — 83036 HEMOGLOBIN GLYCOSYLATED A1C: CPT | Performed by: STUDENT IN AN ORGANIZED HEALTH CARE EDUCATION/TRAINING PROGRAM

## 2024-03-22 RX ORDER — INSULIN ASPART 100 [IU]/ML
INJECTION, SOLUTION INTRAVENOUS; SUBCUTANEOUS
Qty: 45 ML | Refills: 0 | Status: SHIPPED | OUTPATIENT
Start: 2024-03-22

## 2024-03-22 RX ORDER — TIRZEPATIDE 5 MG/.5ML
5 INJECTION, SOLUTION SUBCUTANEOUS WEEKLY
Qty: 2 ML | Refills: 0 | Status: SHIPPED | OUTPATIENT
Start: 2024-03-22

## 2024-03-22 RX ORDER — INSULIN GLARGINE 100 [IU]/ML
58 INJECTION, SOLUTION SUBCUTANEOUS NIGHTLY
Qty: 46 ML | Refills: 0 | Status: SHIPPED | OUTPATIENT
Start: 2024-03-22

## 2024-03-22 NOTE — PROGRESS NOTES
Chief Complaint   Patient presents with    Follow-up    Diabetes     /85 (Site: Left Lower Arm, Position: Sitting, Cuff Size: Large Adult)   Pulse 97   Temp 98.4 °F (36.9 °C) (Oral)   Resp 18   Ht 1.753 m (5' 9\")   Wt (!) 163.2 kg (359 lb 11.2 oz)   SpO2 98%   BMI 53.12 kg/m²     
12:19 PM    CL 95 09/11/2023 12:19 PM    CO2 23 09/11/2023 12:19 PM       Results for orders placed or performed in visit on 03/22/24   AMB POC HEMOGLOBIN A1C   Result Value Ref Range    Hemoglobin A1C, POC 11.7 %   AMB POC GLUCOSE BLOOD, BY GLUCOSE MONITORING DEVICE   Result Value Ref Range    Glucose,  MG/DL       Lab Results   Component Value Date    LABA1C 13.6 (H) 09/11/2023    LABA1C 12.8 (H) 03/01/2023     Lab Results   Component Value Date    MALBCR 1141 (H) 03/01/2023    LDLCALC 133 (H) 09/11/2023    CREATININE 0.71 09/11/2023           Assessment/Plan:     1. Type 2 diabetes mellitus with hyperglycemia, with long-term current use of insulin ([facility])          Orders Placed This Encounter   Procedures    AMB POC HEMOGLOBIN A1C    AMB POC GLUCOSE BLOOD, BY GLUCOSE MONITORING DEVICE     1.Type 2 DM   -uncontrolled as above   -discussed importance of optimum glycemic control   Results for orders placed or performed in visit on 03/22/24   AMB POC HEMOGLOBIN A1C   Result Value Ref Range    Hemoglobin A1C, POC 11.7 %   AMB POC GLUCOSE BLOOD, BY GLUCOSE MONITORING DEVICE   Result Value Ref Range    Glucose,  MG/DL       Recommendations   Encouraged patient to start mounjaro   Continue Lantus to 58 units   Continue novolog to 30 units with meals.to take before meals.   Discussed mounjaro- she will start  Refer to DM education   Prescribed Elaine Sensor   Patient has a diagnosis of diabetes;   Patient is currently treated with >3  daily administrations of insulin   Patient requires frequent adjustment to the insulin treatment regimen based on glucose results (either finger stick or CGM readings);   Patient was seen for diabetes management in past 6 months.  Refer to podiatry       2. Hyperlipidemia   9/11/23 - LDL at 133  Lab Results   Component Value Date    CHOL 226 (H) 09/11/2023    TRIG 211 (H) 09/11/2023    HDL 56 09/11/2023    LDLCALC 133 (H) 09/11/2023    VLDL 57 (H) 03/01/2023       Reports crestor

## 2024-03-26 ENCOUNTER — TELEPHONE (OUTPATIENT)
Age: 49
End: 2024-03-26

## 2024-03-26 RX ORDER — TIRZEPATIDE 5 MG/.5ML
5 INJECTION, SOLUTION SUBCUTANEOUS WEEKLY
Qty: 2 ML | Refills: 0 | Status: SHIPPED | OUTPATIENT
Start: 2024-03-26

## 2024-03-26 NOTE — TELEPHONE ENCOUNTER
Indigo Good called in because [facility] wood not have Mounjaro, could you please sned the prescription to [address]

## 2024-05-03 ENCOUNTER — OFFICE VISIT (OUTPATIENT)
Age: 49
End: 2024-05-03
Payer: MEDICARE

## 2024-05-03 DIAGNOSIS — E11.49 TYPE 2 DIABETES MELLITUS WITH OTHER DIABETIC NEUROLOGICAL COMPLICATION (HCC): Primary | ICD-10-CM

## 2024-05-03 PROCEDURE — G0108 DIAB MANAGE TRN  PER INDIV: HCPCS

## 2024-05-03 NOTE — PROGRESS NOTES
[facility]  Diabetes Self-Management Education & Support Program  Pre-program Assessment    Reason for Referral: DSMES  Referral Source: Sylvia Montana,*  Services requested: PDH Services: DSMES - Initial    ASSESSMENT    From my perspective, the participant would benefit from DSMES specifically related to reducing risks, healthy eating, monitoring, taking medications, physical activity, healthy coping, and problem solving. Will adapt DSMES program to build on participant's skills score, confidence score, and preparedness score as noted in the Diabetes Skills, Confidence, and Preparedness Index.    During the program, we will focus on providing DSMES that specifically addresses participant's interest in monitoring, taking medications, and physical activity, as shown by their reported readiness to change.    The participant would be best served by attending weekly group class series.    Diabetes Self-Management Education Follow-up Visit: May 2024       Clinical Presentation  Indigo Good is a 48 y.o.  female referred for diabetes self-management education. Participant has Type 2 DM on insulin for 1-10 years. Family history positive for diabetes.     Patient reports not receiving DSMES services in the past. Patient has Medicare Yes. If yes, has Medicare paid for DSMES in the past no.  If yes, patient verifies they understand they are responsible for any DSMES charges incurred N/A.  If patient is unsure, patient has been advised to contact Medicare to verify coverage for DSMES N/A.    Most recent A1c value:   Lab Results   Component Value Date/Time    DWX4IVUV 11.7 03/22/2024 11:58 AM       Diabetes-related medical history:  Acute complications    Neurological complications  peripheral neuropathy  Microvascular disease    Macrovascular disease    Other associated conditions         Diabetes-related medications:  Current dosing: Lantus SoloStar Sopn - 100

## 2024-05-08 ENCOUNTER — NURSE ONLY (OUTPATIENT)
Age: 49
End: 2024-05-08
Payer: MEDICARE

## 2024-05-08 DIAGNOSIS — Z79.4 TYPE 2 DIABETES MELLITUS WITHOUT COMPLICATION, WITH LONG-TERM CURRENT USE OF INSULIN (HCC): Primary | ICD-10-CM

## 2024-05-08 DIAGNOSIS — E11.9 TYPE 2 DIABETES MELLITUS WITHOUT COMPLICATION, WITH LONG-TERM CURRENT USE OF INSULIN (HCC): Primary | ICD-10-CM

## 2024-05-08 PROCEDURE — G0109 DIAB MANAGE TRN IND/GROUP: HCPCS

## 2024-05-09 ENCOUNTER — OFFICE VISIT (OUTPATIENT)
Age: 49
End: 2024-05-09
Payer: MEDICARE

## 2024-05-09 VITALS
SYSTOLIC BLOOD PRESSURE: 132 MMHG | TEMPERATURE: 97.5 F | RESPIRATION RATE: 18 BRPM | HEART RATE: 94 BPM | DIASTOLIC BLOOD PRESSURE: 83 MMHG | HEIGHT: 69 IN | WEIGHT: 293 LBS | BODY MASS INDEX: 43.4 KG/M2 | OXYGEN SATURATION: 99 %

## 2024-05-09 DIAGNOSIS — E11.65 TYPE 2 DIABETES MELLITUS WITH HYPERGLYCEMIA, WITH LONG-TERM CURRENT USE OF INSULIN (HCC): Primary | ICD-10-CM

## 2024-05-09 DIAGNOSIS — R80.9 MICROALBUMINURIA: ICD-10-CM

## 2024-05-09 DIAGNOSIS — E78.2 MIXED HYPERLIPIDEMIA: ICD-10-CM

## 2024-05-09 DIAGNOSIS — E11.65 TYPE 2 DIABETES MELLITUS WITH HYPERGLYCEMIA, WITH LONG-TERM CURRENT USE OF INSULIN (HCC): ICD-10-CM

## 2024-05-09 DIAGNOSIS — E11.49 TYPE 2 DIABETES MELLITUS WITH OTHER DIABETIC NEUROLOGICAL COMPLICATION (HCC): ICD-10-CM

## 2024-05-09 DIAGNOSIS — Z79.4 TYPE 2 DIABETES MELLITUS WITH HYPERGLYCEMIA, WITH LONG-TERM CURRENT USE OF INSULIN (HCC): ICD-10-CM

## 2024-05-09 DIAGNOSIS — Z79.4 TYPE 2 DIABETES MELLITUS WITH HYPERGLYCEMIA, WITH LONG-TERM CURRENT USE OF INSULIN (HCC): Primary | ICD-10-CM

## 2024-05-09 LAB — GLUCOSE, POC: 320 MG/DL

## 2024-05-09 PROCEDURE — G8427 DOCREV CUR MEDS BY ELIG CLIN: HCPCS | Performed by: STUDENT IN AN ORGANIZED HEALTH CARE EDUCATION/TRAINING PROGRAM

## 2024-05-09 PROCEDURE — 3079F DIAST BP 80-89 MM HG: CPT | Performed by: STUDENT IN AN ORGANIZED HEALTH CARE EDUCATION/TRAINING PROGRAM

## 2024-05-09 PROCEDURE — 1036F TOBACCO NON-USER: CPT | Performed by: STUDENT IN AN ORGANIZED HEALTH CARE EDUCATION/TRAINING PROGRAM

## 2024-05-09 PROCEDURE — 82962 GLUCOSE BLOOD TEST: CPT | Performed by: STUDENT IN AN ORGANIZED HEALTH CARE EDUCATION/TRAINING PROGRAM

## 2024-05-09 PROCEDURE — 3075F SYST BP GE 130 - 139MM HG: CPT | Performed by: STUDENT IN AN ORGANIZED HEALTH CARE EDUCATION/TRAINING PROGRAM

## 2024-05-09 PROCEDURE — 3046F HEMOGLOBIN A1C LEVEL >9.0%: CPT | Performed by: STUDENT IN AN ORGANIZED HEALTH CARE EDUCATION/TRAINING PROGRAM

## 2024-05-09 PROCEDURE — 2022F DILAT RTA XM EVC RTNOPTHY: CPT | Performed by: STUDENT IN AN ORGANIZED HEALTH CARE EDUCATION/TRAINING PROGRAM

## 2024-05-09 PROCEDURE — 99213 OFFICE O/P EST LOW 20 MIN: CPT | Performed by: STUDENT IN AN ORGANIZED HEALTH CARE EDUCATION/TRAINING PROGRAM

## 2024-05-09 PROCEDURE — G8417 CALC BMI ABV UP PARAM F/U: HCPCS | Performed by: STUDENT IN AN ORGANIZED HEALTH CARE EDUCATION/TRAINING PROGRAM

## 2024-05-09 RX ORDER — TIRZEPATIDE 5 MG/.5ML
5 INJECTION, SOLUTION SUBCUTANEOUS WEEKLY
Qty: 2 ML | Refills: 1 | Status: SHIPPED | OUTPATIENT
Start: 2024-05-09

## 2024-05-09 RX ORDER — INSULIN GLARGINE 100 [IU]/ML
INJECTION, SOLUTION SUBCUTANEOUS
Qty: 5 ADJUSTABLE DOSE PRE-FILLED PEN SYRINGE | Refills: 3 | Status: SHIPPED | OUTPATIENT
Start: 2024-05-09

## 2024-05-09 RX ORDER — FLUCONAZOLE 150 MG/1
150 TABLET ORAL ONCE
Qty: 1 TABLET | Refills: 0 | Status: SHIPPED | OUTPATIENT
Start: 2024-05-09 | End: 2024-05-09

## 2024-05-09 RX ORDER — INSULIN ASPART 100 [IU]/ML
INJECTION, SOLUTION INTRAVENOUS; SUBCUTANEOUS
Qty: 45 ML | Refills: 0 | Status: SHIPPED | OUTPATIENT
Start: 2024-05-09

## 2024-05-09 NOTE — PROGRESS NOTES
[facility]  Diabetes Self-Management Education & Support Program  Encounter Note      SUMMARY  Diabetes self-care management training was completed related to reducing risks. The participant will return on May 15 to continue DSMES related to healthy eating and monitoring. The participant did identify SMART Goal(s) and will practice knowledge and skills related to reducing risks to improve diabetes self-management.        EVALUATION:  Participant was engaged in learning and shared in group discussions. She shared that she was diagnosed with KHRIS.  Does not use CPAP.  She is not up to date on vaccines.  She is up to date on eye and dental exams.  She will make appt for foot exam.  She was able to identify her personal care team during class.     RECOMMENDATIONS:  Continue DSMES classes  Make needed appointments     TOPICS DISCUSSED TODAY:  WHAT IS DIABETES? Minutes: 60  HOW DO I STAY HEALTHY? 60      Next provider visit is scheduled for 5/15/24       SMART GOAL(S)   Make podiatry appointment before next class  ACHIEVEMENT OF GOAL(S) : 0-24%         DATE DSMES TOPIC EVALUATION     5/9/2024 WHAT IS DIABETES?   Role of the normal pancreas in energy balance and blood glucose control   The defect seen in diabetes   Signs & symptoms of diabetes   Diagnosis of diabetes   Types of diabetes   Blood glucose targets in non-pregnant & non-pregnant adults       The participant knows  Their type of diabetes: Yes   The basic physiologic defect: Yes  Blood glucose targets: Yes     DATE DSMES TOPIC EVALUATION     5/9/2024 HOW DO I STAY HEALTHY?   Prevention   Vaccinations   Preconception care (if applicable)  Examinations   Eye    Foot   Diabetic complications' prevention   Dental health   Heart health   Kidney Health   Nerve health   Sleep health      The participant has a personal diabetes care record to keep abreast of diabetes health Yes                FREDA GIL RN on 5/9/2024 at 2:00 PM    I

## 2024-05-09 NOTE — PROGRESS NOTES
[facility]                                                                                    Sylvia Montana M.D          Patient Information  Date:5/9/2024  Name : Indigo Good 48 y.o.     YOB: 1975         Referred by: Kelly Driver MD       Chief Complaint   Patient presents with    Follow-up    Diabetes     DM-2       History of Present Illness: Indigo Good is a 48 y.o. female with hypertension, hyperlipidemia, diabetic peripheral neuropathy, obstructive sleep apnea, PE on chronic anticoagulation who presented for type 2 DM       5/2024   Has not started mounjaro because reports [facility] did not have it and they did not transfer the prescription [facility]  Did not get meter         3/2024   Not started mounjaro, asking about ACV keto gummies.   Taking novolog after meals.       10/20/23 : Has still not started taking mounjaro because was afraid of dropping BG     BG - 306,297,290, 108,256,286,322   Has not received tiny sensor.       Type 2 DM     Diagnosis: long time back  · Current treatment: Lantus 30 units bid, Novolog 18-20 units after dinner,Metformin 1 g bID, was prescribed mounjaro but does not know how to take   · Past treatment: Victoza   · Glucose checks: 3-4 times a day mostly 300-400s   · Hyperglycemia: yes  · Hypoglycemia: no  · Meals per day: 3. Breakfast: eggs, sausage, toast, juice or water, lunch: burger and fries and soda, dinner: vegetable, sweet tea and meat, snacks: candy or cake, soda, sweet tea, juice  · Exercise: no  · DM related hospitalizations: no  Steroids: Prednisone for pain x      Smoking: no, vape  Family history of coronary artery disease/stroke: no     Complications of DM:  · CAD: no  · CVA: no  · PVD: no  · Amputations: no   · Retinopathy: no; last exam was 9/23   · Gastropathy: no  · Nephropathy: no  · Neuropathy: yes gabapentin  Sees podiatrist:    Medications:  · Statin: rosuvastatin 20 mg orally   · ACE-I:

## 2024-05-09 NOTE — PROGRESS NOTES
Chief Complaint   Patient presents with    Follow-up    Diabetes     DM-2     BP (!) 141/88 (Site: Left Lower Arm, Position: Sitting, Cuff Size: Large Adult)   Pulse 94   Temp 97.5 °F (36.4 °C) (Temporal)   Resp 18   Ht 1.753 m (5' 9\")   Wt (!) 165.1 kg (364 lb)   SpO2 99%   BMI 53.75 kg/m²     /83 (Site: Right Lower Arm, Position: Sitting, Cuff Size: Large Adult)   Pulse 94   Temp 97.5 °F (36.4 °C) (Temporal)   Resp 18   Ht 1.753 m (5' 9\")   Wt (!) 165.1 kg (364 lb)   SpO2 99%   BMI 53.75 kg/m²

## 2024-05-15 ENCOUNTER — NURSE ONLY (OUTPATIENT)
Age: 49
End: 2024-05-15
Payer: MEDICARE

## 2024-05-15 DIAGNOSIS — E11.49 TYPE 2 DIABETES MELLITUS WITH OTHER DIABETIC NEUROLOGICAL COMPLICATION (HCC): Primary | ICD-10-CM

## 2024-05-15 PROCEDURE — G0109 DIAB MANAGE TRN IND/GROUP: HCPCS

## 2024-05-15 NOTE — PROGRESS NOTES
[facility]  Diabetes Self-Management Education & Support Program  Encounter Note      SUMMARY  Diabetes self-care management training was completed related to healthy eating and monitoring. The participant will return on May 22 to continue DSMES related to taking medications and physical activity. The participant did identify SMART Goal(s) and will practice knowledge and skills related to reducing risks and healthy eating and monitoring to improve diabetes self-management.        EVALUATION:  Participant was engaged in learning and group discussions.  She shared that she had heard that you couldn't eat starchy foods when you have diabetes.  She was able to read food labels and count carbohydrates with accuracy.  She demonstrates proper technique with blood glucose monitoring.  She designed a healthy plate for dinner meal with 45 gm CHO.      RECOMMENDATIONS:  Continue DSMES classes     TOPICS DISCUSSED TODAY:  WHAT CAN I EAT? 60  HOW CAN BLOOD GLUCOSE MONITORING HELP ME? 60      Next provider visit is scheduled for 5/22/24       SMART GOAL(S)  Practice building a balanced meal for dinner meal at least 4 times over the next week.  ACHIEVEMENT OF GOAL(S) : 0-24%    2.   Make podiatry appointment before next class   ACHIEVEMENT OF GOAL(S) :  %         DATE DSMES TOPIC EVALUATION     5/15/2024 WHAT CAN I EAT?   General principles   Determining a healthy weight   Nutritional terms & tools   Healthy Plate method   Carbohydrate Counting   Reading food labels   Free apps   Pregnancy recommendations      The participant   Uses Healthy Plate principles in constructing meals: Yes  Reads food labels in choosing acceptable foods: Yes         DATE DSMES TOPIC EVALUATION     5/15/2024 HOW CAN BLOOD GLUCOSE MONITORING HELP ME?   Value of blood glucose monitoring   Realistic expectations   Blood glucose monitoring targets   Target adjustments   Setting a1c & blood glucose targets with provider

## 2024-05-22 ENCOUNTER — NURSE ONLY (OUTPATIENT)
Age: 49
End: 2024-05-22
Payer: MEDICARE

## 2024-05-22 DIAGNOSIS — E11.49 TYPE 2 DIABETES MELLITUS WITH OTHER DIABETIC NEUROLOGICAL COMPLICATION (HCC): Primary | ICD-10-CM

## 2024-05-22 PROCEDURE — G0109 DIAB MANAGE TRN IND/GROUP: HCPCS

## 2024-05-22 NOTE — PROGRESS NOTES
2 medications   Oral agents   GLP-1 agonists   Hypoglycemia symptoms & treatment   Glucagon emergency kits   General guidance regarding insulin use whether Type 1, 2 or gestational diabetes   Storage of insulin   Disposal    Traveling with medications   Barriers to medication adherence      The participant   Can describe the expected action & side effects of prescribed diabetes medications: Yes  Can demonstrate injection technique (if applicable): declined , states she have knowledge.   Discussed prime , prior insulin measurement.     The participant needs to address : prime insulin pen; rotate insulin injection site. .     [facility]     5/22/2024 HOW DOES PHYSICAL ACTIVITY AFFECT MY DIABETES?   Benefits of physical activity   Beginning a program of physical activity   Walking   Pedometers   Goal setting   Structured physical activity program   Aerobic activity   Resistance   Flexibility   Balance   Physical activity program progression   Safety issues   Barriers to physical activity   Facilitators of physical activity        The participant has established a regular physical activity plan: Yes    The participant needs to address : Have authorization from provider for any changes on exercise.          Alysha Brunson RN on 5/22/2024 at 5:03 PM    I have personally reviewed the health record, including provider notes, laboratory data and current medications before making these care and education recommendations. The time spent in this effort is included in the total time.  Total minutes: 120    Indigo Good, Was evaluated in person, group visit. DM class provided by MILY CHADWICK;  MILY TONY .    Was evaluated in person, group visit. DM class provided by MILY CHADWICK;  MILY TONY .      Alysha MINOR  Certified Diabetes and    [facility]   Tel  [phone]

## 2024-06-05 RX ORDER — FUROSEMIDE 20 MG/1
TABLET ORAL
Qty: 30 TABLET | Refills: 0 | Status: SHIPPED | OUTPATIENT
Start: 2024-06-05

## 2024-06-05 RX ORDER — METFORMIN HYDROCHLORIDE 500 MG/1
1000 TABLET, EXTENDED RELEASE ORAL 2 TIMES DAILY
Qty: 120 TABLET | Refills: 0 | Status: SHIPPED | OUTPATIENT
Start: 2024-06-05

## 2024-06-13 RX ORDER — TRIAMCINOLONE ACETONIDE 1 MG/G
OINTMENT TOPICAL
Qty: 80 G | Refills: 0 | Status: SHIPPED | OUTPATIENT
Start: 2024-06-13

## 2024-07-10 ENCOUNTER — OFFICE VISIT (OUTPATIENT)
Age: 49
End: 2024-07-10
Payer: MEDICARE

## 2024-07-10 VITALS
WEIGHT: 293 LBS | SYSTOLIC BLOOD PRESSURE: 157 MMHG | HEART RATE: 97 BPM | DIASTOLIC BLOOD PRESSURE: 93 MMHG | TEMPERATURE: 98.2 F | OXYGEN SATURATION: 98 % | BODY MASS INDEX: 43.4 KG/M2 | RESPIRATION RATE: 18 BRPM | HEIGHT: 69 IN

## 2024-07-10 DIAGNOSIS — Z79.4 TYPE 2 DIABETES MELLITUS WITH HYPERGLYCEMIA, WITH LONG-TERM CURRENT USE OF INSULIN (HCC): Primary | ICD-10-CM

## 2024-07-10 DIAGNOSIS — R80.9 MICROALBUMINURIA: ICD-10-CM

## 2024-07-10 DIAGNOSIS — E11.65 TYPE 2 DIABETES MELLITUS WITH HYPERGLYCEMIA, WITH LONG-TERM CURRENT USE OF INSULIN (HCC): Primary | ICD-10-CM

## 2024-07-10 DIAGNOSIS — I10 ESSENTIAL HYPERTENSION: ICD-10-CM

## 2024-07-10 DIAGNOSIS — E78.2 MIXED HYPERLIPIDEMIA: ICD-10-CM

## 2024-07-10 LAB
GLUCOSE, POC: 270 MG/DL
HBA1C MFR BLD: 10.7 %

## 2024-07-10 PROCEDURE — 2022F DILAT RTA XM EVC RTNOPTHY: CPT | Performed by: STUDENT IN AN ORGANIZED HEALTH CARE EDUCATION/TRAINING PROGRAM

## 2024-07-10 PROCEDURE — 3046F HEMOGLOBIN A1C LEVEL >9.0%: CPT | Performed by: STUDENT IN AN ORGANIZED HEALTH CARE EDUCATION/TRAINING PROGRAM

## 2024-07-10 PROCEDURE — 3077F SYST BP >= 140 MM HG: CPT | Performed by: STUDENT IN AN ORGANIZED HEALTH CARE EDUCATION/TRAINING PROGRAM

## 2024-07-10 PROCEDURE — 99213 OFFICE O/P EST LOW 20 MIN: CPT | Performed by: STUDENT IN AN ORGANIZED HEALTH CARE EDUCATION/TRAINING PROGRAM

## 2024-07-10 PROCEDURE — G8417 CALC BMI ABV UP PARAM F/U: HCPCS | Performed by: STUDENT IN AN ORGANIZED HEALTH CARE EDUCATION/TRAINING PROGRAM

## 2024-07-10 PROCEDURE — 82962 GLUCOSE BLOOD TEST: CPT | Performed by: STUDENT IN AN ORGANIZED HEALTH CARE EDUCATION/TRAINING PROGRAM

## 2024-07-10 PROCEDURE — 83036 HEMOGLOBIN GLYCOSYLATED A1C: CPT | Performed by: STUDENT IN AN ORGANIZED HEALTH CARE EDUCATION/TRAINING PROGRAM

## 2024-07-10 PROCEDURE — G8427 DOCREV CUR MEDS BY ELIG CLIN: HCPCS | Performed by: STUDENT IN AN ORGANIZED HEALTH CARE EDUCATION/TRAINING PROGRAM

## 2024-07-10 PROCEDURE — 1036F TOBACCO NON-USER: CPT | Performed by: STUDENT IN AN ORGANIZED HEALTH CARE EDUCATION/TRAINING PROGRAM

## 2024-07-10 PROCEDURE — 3080F DIAST BP >= 90 MM HG: CPT | Performed by: STUDENT IN AN ORGANIZED HEALTH CARE EDUCATION/TRAINING PROGRAM

## 2024-07-10 RX ORDER — HYDROCHLOROTHIAZIDE 25 MG/1
25 TABLET ORAL
COMMUNITY
Start: 2014-04-02

## 2024-07-10 RX ORDER — INSULIN ASPART 100 [IU]/ML
INJECTION, SOLUTION INTRAVENOUS; SUBCUTANEOUS
Qty: 45 ML | Refills: 2 | Status: SHIPPED | OUTPATIENT
Start: 2024-07-10

## 2024-07-10 RX ORDER — INSULIN GLARGINE-YFGN 100 [IU]/ML
INJECTION, SOLUTION SUBCUTANEOUS
COMMUNITY
Start: 2024-05-16 | End: 2024-07-10

## 2024-07-10 RX ORDER — TIRZEPATIDE 7.5 MG/.5ML
7.5 INJECTION, SOLUTION SUBCUTANEOUS WEEKLY
Qty: 12 ADJUSTABLE DOSE PRE-FILLED PEN SYRINGE | Refills: 0 | Status: SHIPPED | OUTPATIENT
Start: 2024-07-24

## 2024-07-10 RX ORDER — INSULIN GLARGINE 100 [IU]/ML
INJECTION, SOLUTION SUBCUTANEOUS
Qty: 5 ADJUSTABLE DOSE PRE-FILLED PEN SYRINGE | Refills: 3 | Status: SHIPPED | OUTPATIENT
Start: 2024-07-10

## 2024-07-10 RX ORDER — PANTOPRAZOLE SODIUM 40 MG/10ML
40 INJECTION, POWDER, LYOPHILIZED, FOR SOLUTION INTRAVENOUS
COMMUNITY
Start: 2014-04-02 | End: 2024-07-10

## 2024-07-10 RX ORDER — INSULIN LISPRO 100 [IU]/ML
INJECTION, SOLUTION INTRAVENOUS; SUBCUTANEOUS
COMMUNITY
Start: 2014-04-01 | End: 2024-07-10

## 2024-07-10 NOTE — PROGRESS NOTES
Chief Complaint   Patient presents with    Follow-up    Diabetes     BP (!) 156/99 (Site: Left Lower Arm, Position: Sitting, Cuff Size: Medium Adult)   Pulse 97   Temp 98.2 °F (36.8 °C) (Temporal)   Resp 18   Ht 1.753 m (5' 9\")   Wt (!) 164.4 kg (362 lb 6.4 oz)   SpO2 98%   BMI 53.52 kg/m²     BP (!) 157/93 (Site: Right Lower Arm, Position: Sitting, Cuff Size: Medium Adult)   Pulse 97   Temp 98.2 °F (36.8 °C) (Temporal)   Resp 18   Ht 1.753 m (5' 9\")   Wt (!) 164.4 kg (362 lb 6.4 oz)   SpO2 98%   BMI 53.52 kg/m²     
insurance0   Continue novolog to 30 units with meals.to take before meals.   Referred  to DM education-following   Declined CGM       2. Hyperlipidemia   9/11/23 - LDL at 133  Lab Results   Component Value Date    CHOL 226 (H) 09/11/2023    TRIG 211 (H) 09/11/2023    HDL 56 09/11/2023    VLDL 37 09/11/2023       Reports crestor was increased recently   Repeat at next visit     3. Obesity  Mounjaro as above  Declined referral to Bariatric surgery     4. Hypertension   On losartan and lasix   Discussed goal of 130/80 in pts with DM      5. Microalbuminuria  Will be good candidate for jardiance  On losartan             I have discussed the diagnosis with the patient and the intended plan .  The patient has received an after-visit summary and questions were answered concerning future plans.  I have discussed medication side effects .    Thank you for allowing me to participate in the care of this patient.    Sylvia Montana      There are no Patient Instructions on file for this visit.  No follow-up provider specified.          Patient /caregiver verbalized understanding .  Voice-recognition software was used to generate this report, which may result in some phonetic-based errors in the grammar and contents.  Even though attempts were made to correct all the mistakes, some may have been missed and remained in the body of the report.

## 2024-09-12 DIAGNOSIS — E78.2 MIXED HYPERLIPIDEMIA: ICD-10-CM

## 2024-09-12 RX ORDER — ROSUVASTATIN CALCIUM 20 MG/1
20 TABLET, COATED ORAL NIGHTLY
Qty: 30 TABLET | Refills: 0 | Status: SHIPPED | OUTPATIENT
Start: 2024-09-12

## 2024-10-07 ENCOUNTER — TELEPHONE (OUTPATIENT)
Facility: CLINIC | Age: 49
End: 2024-10-07

## 2024-10-07 RX ORDER — FUROSEMIDE 20 MG
20 TABLET ORAL DAILY PRN
Qty: 30 TABLET | Refills: 0 | Status: SHIPPED | OUTPATIENT
Start: 2024-10-07 | End: 2024-10-11 | Stop reason: SDUPTHER

## 2024-10-07 RX ORDER — METFORMIN HCL 500 MG
1000 TABLET, EXTENDED RELEASE 24 HR ORAL 2 TIMES DAILY
Qty: 120 TABLET | Refills: 0 | Status: SHIPPED | OUTPATIENT
Start: 2024-10-07

## 2024-10-07 RX ORDER — OMEPRAZOLE 40 MG/1
40 CAPSULE, DELAYED RELEASE ORAL DAILY
Qty: 30 CAPSULE | Refills: 0 | Status: SHIPPED | OUTPATIENT
Start: 2024-10-07 | End: 2024-10-11 | Stop reason: SDUPTHER

## 2024-10-07 RX ORDER — CYCLOBENZAPRINE HCL 10 MG
10 TABLET ORAL DAILY PRN
Qty: 30 TABLET | Refills: 0 | Status: SHIPPED | OUTPATIENT
Start: 2024-10-07 | End: 2024-10-11 | Stop reason: SDUPTHER

## 2024-10-07 NOTE — TELEPHONE ENCOUNTER
The patient called into the office stating she is out of her medication losartan mg 100 tab,   metFORMIN (GLUCOPHAGE-XR) 500 MG extended release tablet     omeprazole (PRILOSEC) 40 MG delayed release capsule       cyclobenzaprine (FLEXERIL) 10 MG table     furosemide (LASIX) 20 MG tablet  and sent to   [id number] - [place], [place] - [address] - P [phone] - F [phone]  [address] [place] [id number]    She has a follow up appt on 10/10

## 2024-10-10 RX ORDER — TIRZEPATIDE 7.5 MG/.5ML
7.5 INJECTION, SOLUTION SUBCUTANEOUS WEEKLY
Qty: 4 ADJUSTABLE DOSE PRE-FILLED PEN SYRINGE | Refills: 2 | Status: SHIPPED | OUTPATIENT
Start: 2024-10-10

## 2024-10-11 ENCOUNTER — OFFICE VISIT (OUTPATIENT)
Facility: CLINIC | Age: 49
End: 2024-10-11

## 2024-10-11 VITALS
BODY MASS INDEX: 43.4 KG/M2 | HEIGHT: 69 IN | SYSTOLIC BLOOD PRESSURE: 137 MMHG | TEMPERATURE: 97.8 F | DIASTOLIC BLOOD PRESSURE: 87 MMHG | WEIGHT: 293 LBS | OXYGEN SATURATION: 99 % | HEART RATE: 92 BPM

## 2024-10-11 DIAGNOSIS — E78.2 MIXED HYPERLIPIDEMIA: ICD-10-CM

## 2024-10-11 DIAGNOSIS — I10 ESSENTIAL HYPERTENSION, BENIGN: ICD-10-CM

## 2024-10-11 DIAGNOSIS — C54.1 ENDOMETRIAL CANCER (HCC): ICD-10-CM

## 2024-10-11 DIAGNOSIS — Z00.00 MEDICARE ANNUAL WELLNESS VISIT, SUBSEQUENT: Primary | ICD-10-CM

## 2024-10-11 DIAGNOSIS — L21.9 SEBORRHEIC DERMATITIS: ICD-10-CM

## 2024-10-11 DIAGNOSIS — I26.99 OTHER PULMONARY EMBOLISM WITHOUT ACUTE COR PULMONALE, UNSPECIFIED CHRONICITY (HCC): ICD-10-CM

## 2024-10-11 DIAGNOSIS — M54.16 LUMBAR RADICULOPATHY: ICD-10-CM

## 2024-10-11 DIAGNOSIS — Z12.11 COLON CANCER SCREENING: ICD-10-CM

## 2024-10-11 RX ORDER — FUROSEMIDE 20 MG
20 TABLET ORAL DAILY PRN
Qty: 30 TABLET | Refills: 0 | Status: SHIPPED | OUTPATIENT
Start: 2024-10-11

## 2024-10-11 RX ORDER — LIDOCAINE 50 MG/G
1 PATCH TOPICAL DAILY
Qty: 30 PATCH | Refills: 3 | Status: SHIPPED | OUTPATIENT
Start: 2024-10-11 | End: 2025-02-08

## 2024-10-11 RX ORDER — KETOCONAZOLE 20 MG/G
CREAM TOPICAL
Qty: 30 G | Refills: 1 | Status: SHIPPED | OUTPATIENT
Start: 2024-10-11

## 2024-10-11 RX ORDER — ROSUVASTATIN CALCIUM 20 MG/1
20 TABLET, COATED ORAL NIGHTLY
Qty: 90 TABLET | Refills: 1 | Status: SHIPPED | OUTPATIENT
Start: 2024-10-11

## 2024-10-11 RX ORDER — CYCLOBENZAPRINE HCL 10 MG
10 TABLET ORAL DAILY PRN
Qty: 30 TABLET | Refills: 3 | Status: SHIPPED | OUTPATIENT
Start: 2024-10-11

## 2024-10-11 RX ORDER — OMEPRAZOLE 40 MG/1
40 CAPSULE, DELAYED RELEASE ORAL DAILY
Qty: 90 CAPSULE | Refills: 1 | Status: SHIPPED | OUTPATIENT
Start: 2024-10-11

## 2024-10-11 RX ORDER — LOSARTAN POTASSIUM 100 MG/1
100 TABLET ORAL DAILY
Qty: 90 TABLET | Refills: 1 | Status: SHIPPED | OUTPATIENT
Start: 2024-10-11

## 2024-10-11 SDOH — ECONOMIC STABILITY: FOOD INSECURITY: WITHIN THE PAST 12 MONTHS, YOU WORRIED THAT YOUR FOOD WOULD RUN OUT BEFORE YOU GOT MONEY TO BUY MORE.: NEVER TRUE

## 2024-10-11 SDOH — ECONOMIC STABILITY: FOOD INSECURITY: WITHIN THE PAST 12 MONTHS, THE FOOD YOU BOUGHT JUST DIDN'T LAST AND YOU DIDN'T HAVE MONEY TO GET MORE.: NEVER TRUE

## 2024-10-11 SDOH — ECONOMIC STABILITY: INCOME INSECURITY: HOW HARD IS IT FOR YOU TO PAY FOR THE VERY BASICS LIKE FOOD, HOUSING, MEDICAL CARE, AND HEATING?: NOT HARD AT ALL

## 2024-10-11 ASSESSMENT — PATIENT HEALTH QUESTIONNAIRE - PHQ9
SUM OF ALL RESPONSES TO PHQ QUESTIONS 1-9: 9
8. MOVING OR SPEAKING SO SLOWLY THAT OTHER PEOPLE COULD HAVE NOTICED. OR THE OPPOSITE, BEING SO FIGETY OR RESTLESS THAT YOU HAVE BEEN MOVING AROUND A LOT MORE THAN USUAL: NOT AT ALL
9. THOUGHTS THAT YOU WOULD BE BETTER OFF DEAD, OR OF HURTING YOURSELF: NOT AT ALL
3. TROUBLE FALLING OR STAYING ASLEEP: NEARLY EVERY DAY
7. TROUBLE CONCENTRATING ON THINGS, SUCH AS READING THE NEWSPAPER OR WATCHING TELEVISION: NOT AT ALL
10. IF YOU CHECKED OFF ANY PROBLEMS, HOW DIFFICULT HAVE THESE PROBLEMS MADE IT FOR YOU TO DO YOUR WORK, TAKE CARE OF THINGS AT HOME, OR GET ALONG WITH OTHER PEOPLE: NOT DIFFICULT AT ALL
2. FEELING DOWN, DEPRESSED OR HOPELESS: NEARLY EVERY DAY
SUM OF ALL RESPONSES TO PHQ9 QUESTIONS 1 & 2: 3
5. POOR APPETITE OR OVEREATING: NOT AT ALL
1. LITTLE INTEREST OR PLEASURE IN DOING THINGS: NOT AT ALL
SUM OF ALL RESPONSES TO PHQ QUESTIONS 1-9: 9
SUM OF ALL RESPONSES TO PHQ QUESTIONS 1-9: 9
6. FEELING BAD ABOUT YOURSELF - OR THAT YOU ARE A FAILURE OR HAVE LET YOURSELF OR YOUR FAMILY DOWN: NOT AT ALL
4. FEELING TIRED OR HAVING LITTLE ENERGY: NEARLY EVERY DAY
SUM OF ALL RESPONSES TO PHQ QUESTIONS 1-9: 9

## 2024-10-11 ASSESSMENT — LIFESTYLE VARIABLES
HOW MANY STANDARD DRINKS CONTAINING ALCOHOL DO YOU HAVE ON A TYPICAL DAY: PATIENT DOES NOT DRINK
HOW OFTEN DO YOU HAVE A DRINK CONTAINING ALCOHOL: NEVER

## 2024-10-11 NOTE — PROGRESS NOTES
.  Chief Complaint   Patient presents with    Medicare AWV    Discuss Labs       \"Have you been to the ER, urgent care clinic since your last visit?  Hospitalized since your last visit?\"    NO    “Have you seen or consulted any other health care providers outside our system since your last visit?”    YES - When: approximately 3 months ago.  Where and Why: Dr. Crabtree for osteoarthritis follow up.      “Have you had a colorectal cancer screening such as a colonoscopy/FIT/Cologuard?    NO    No colonoscopy on file  No cologuard on file  No FIT/FOBT on file   No flexible sigmoidoscopy on file     “Have you had a diabetic eye exam?”    YES - Where: [facility] Nurse/CMA to request most recent records if not in the chart     Date of last diabetic eye exam: 1/24/2019     ./87 (Site: Right Upper Arm, Position: Sitting, Cuff Size: Large Adult)   Pulse 92   Temp 97.8 °F (36.6 °C) (Oral)   Ht 1.753 m (5' 9\")   Wt (!) 167.8 kg (370 lb)   SpO2 99%   BMI 54.64 kg/m²

## 2024-10-11 NOTE — PROGRESS NOTES
Medicare Annual Wellness Visit    Indigo Good is here for Medicare AW and Discuss Labs    Assessment & Plan  1. Medicare Wellness Examination  Her daughter Mitzi Good is her healthcare decision maker  Hypertension.  Advised  flu vaccine and colonoscopy.    2. Hypertension.  Blood pressure readings are within normal range. Continue current medications: losartan and hydrochlorothiazide 25 mg and monitor blood pressure.    2. Mixed Hyperlipidemia.  Advised a low-fat diet, continue Crestor  Blood work will be ordered to check cholesterol levels.     3. Type 2 Diabetes Mellitus.  A1c has decreased from 12 to 10.7. Continue current medications: metformin, insulin (Singulair 58 units, NovoLog sliding scale), and Mounjaro. She is advised to maintain dietary modifications and continue attending diabetic classes and keep appointment with Dr. Montana..     4. History of Pulmonary Embolism.  Continue current medication: Eliquis 5 mg.  Fall precautions advised, labs from Dr. Gil's office reviewed.    5. History of Morbid Obesity.  Weight has increased from 364 lbs in May 2024 to 370 lbs currently. She is advised to continue with dietary modifications and weight loss efforts.    6. History of Endometrial Cancer.  She is reminded to inform Dr Gil about her history of endometrial cancer for appropriate monitoring.    7. Arthritis.  She reports worsening symptoms in her shoulders and neck. Physical therapy has been tried but worsens symptoms. A prescription for lidocaine patch 5% daily will be sent to the pharmacy.    8. Depression.  She reports feeling depressed since the passing of her mother in May. Counseling is offered, and she is advised to contact if she wishes to pursue this option.    9. Health Maintenance.  - Tetanus vaccine will be sent to the pharmacy.  - Referral to a gastroenterologist for a screening colonoscopy.  - Mammogram is scheduled for later this month.  - She is advised to soak her feet

## 2024-10-25 ENCOUNTER — OFFICE VISIT (OUTPATIENT)
Age: 49
End: 2024-10-25
Payer: MEDICARE

## 2024-10-25 VITALS
WEIGHT: 293 LBS | DIASTOLIC BLOOD PRESSURE: 86 MMHG | OXYGEN SATURATION: 99 % | HEIGHT: 69 IN | TEMPERATURE: 98.9 F | RESPIRATION RATE: 16 BRPM | SYSTOLIC BLOOD PRESSURE: 138 MMHG | BODY MASS INDEX: 43.4 KG/M2 | HEART RATE: 111 BPM

## 2024-10-25 DIAGNOSIS — R80.9 MICROALBUMINURIA: ICD-10-CM

## 2024-10-25 DIAGNOSIS — F43.20 ADJUSTMENT DISORDER, UNSPECIFIED TYPE: ICD-10-CM

## 2024-10-25 DIAGNOSIS — I10 ESSENTIAL HYPERTENSION: ICD-10-CM

## 2024-10-25 DIAGNOSIS — B37.9 YEAST INFECTION: ICD-10-CM

## 2024-10-25 DIAGNOSIS — E78.2 MIXED HYPERLIPIDEMIA: ICD-10-CM

## 2024-10-25 DIAGNOSIS — Z79.4 TYPE 2 DIABETES MELLITUS WITH HYPERGLYCEMIA, WITH LONG-TERM CURRENT USE OF INSULIN (HCC): Primary | ICD-10-CM

## 2024-10-25 DIAGNOSIS — E11.65 TYPE 2 DIABETES MELLITUS WITH HYPERGLYCEMIA, WITH LONG-TERM CURRENT USE OF INSULIN (HCC): Primary | ICD-10-CM

## 2024-10-25 LAB
BILIRUBIN, URINE, POC: NEGATIVE
BLOOD URINE, POC: NEGATIVE
GLUCOSE URINE, POC: 1000
GLUCOSE, POC: 450 MG/DL
HBA1C MFR BLD: 12.3 %
KETONES, URINE, POC: NEGATIVE
LEUKOCYTE ESTERASE, URINE, POC: ABNORMAL
NITRITE, URINE, POC: NEGATIVE
PH, URINE, POC: 6.5 (ref 4.6–8)
PROTEIN,URINE, POC: 30
SPECIFIC GRAVITY, URINE, POC: 1.01 (ref 1–1.03)
URINALYSIS CLARITY, POC: CLEAR
URINALYSIS COLOR, POC: YELLOW
UROBILINOGEN, POC: ABNORMAL

## 2024-10-25 PROCEDURE — 82962 GLUCOSE BLOOD TEST: CPT | Performed by: STUDENT IN AN ORGANIZED HEALTH CARE EDUCATION/TRAINING PROGRAM

## 2024-10-25 PROCEDURE — 3075F SYST BP GE 130 - 139MM HG: CPT | Performed by: STUDENT IN AN ORGANIZED HEALTH CARE EDUCATION/TRAINING PROGRAM

## 2024-10-25 PROCEDURE — 3046F HEMOGLOBIN A1C LEVEL >9.0%: CPT | Performed by: STUDENT IN AN ORGANIZED HEALTH CARE EDUCATION/TRAINING PROGRAM

## 2024-10-25 PROCEDURE — 99214 OFFICE O/P EST MOD 30 MIN: CPT | Performed by: STUDENT IN AN ORGANIZED HEALTH CARE EDUCATION/TRAINING PROGRAM

## 2024-10-25 PROCEDURE — 2022F DILAT RTA XM EVC RTNOPTHY: CPT | Performed by: STUDENT IN AN ORGANIZED HEALTH CARE EDUCATION/TRAINING PROGRAM

## 2024-10-25 PROCEDURE — G8417 CALC BMI ABV UP PARAM F/U: HCPCS | Performed by: STUDENT IN AN ORGANIZED HEALTH CARE EDUCATION/TRAINING PROGRAM

## 2024-10-25 PROCEDURE — 1036F TOBACCO NON-USER: CPT | Performed by: STUDENT IN AN ORGANIZED HEALTH CARE EDUCATION/TRAINING PROGRAM

## 2024-10-25 PROCEDURE — G8484 FLU IMMUNIZE NO ADMIN: HCPCS | Performed by: STUDENT IN AN ORGANIZED HEALTH CARE EDUCATION/TRAINING PROGRAM

## 2024-10-25 PROCEDURE — 83036 HEMOGLOBIN GLYCOSYLATED A1C: CPT | Performed by: STUDENT IN AN ORGANIZED HEALTH CARE EDUCATION/TRAINING PROGRAM

## 2024-10-25 PROCEDURE — 3079F DIAST BP 80-89 MM HG: CPT | Performed by: STUDENT IN AN ORGANIZED HEALTH CARE EDUCATION/TRAINING PROGRAM

## 2024-10-25 PROCEDURE — 81003 URINALYSIS AUTO W/O SCOPE: CPT | Performed by: STUDENT IN AN ORGANIZED HEALTH CARE EDUCATION/TRAINING PROGRAM

## 2024-10-25 PROCEDURE — G8427 DOCREV CUR MEDS BY ELIG CLIN: HCPCS | Performed by: STUDENT IN AN ORGANIZED HEALTH CARE EDUCATION/TRAINING PROGRAM

## 2024-10-25 RX ORDER — FLUCONAZOLE 150 MG/1
150 TABLET ORAL ONCE
Qty: 1 TABLET | Refills: 0 | Status: SHIPPED | OUTPATIENT
Start: 2024-10-25 | End: 2024-10-25

## 2024-10-25 NOTE — PROGRESS NOTES
\"Have you been to the ER, urgent care clinic since your last visit?  Hospitalized since your last visit?\"    NO    “Have you seen or consulted any other health care providers outside our system since your last visit?”    NO      “Have you had a colorectal cancer screening such as a colonoscopy/FIT/Cologuard?    NO    No colonoscopy on file  No cologuard on file  No FIT/FOBT on file   No flexible sigmoidoscopy on file     “Have you had a diabetic eye exam?”    YES - Where: [facility] Nurse/CMA to request most recent records if not in the chart     Date of last diabetic eye exam: 1/24/2019       A1C 12.3    Chief Complaint   Patient presents with    Follow-up    Diabetes     /86 (Site: Right Upper Arm, Position: Sitting, Cuff Size: Large Adult)   Pulse (!) 111   Temp 98.9 °F (37.2 °C) (Temporal)   Resp 16   Ht 1.753 m (5' 9\")   Wt (!) 163 kg (359 lb 4.8 oz)   SpO2 99%   BMI 53.06 kg/m²        
Reviewed:     Lab Results   Component Value Date/Time    GLUCPOC 450 10/25/2024 03:51 PM     09/11/2023 12:19 PM      Lab Results   Component Value Date/Time    BUN 8 09/11/2023 12:19 PM     09/11/2023 12:19 PM    K 4.1 09/11/2023 12:19 PM    CL 95 09/11/2023 12:19 PM    CO2 23 09/11/2023 12:19 PM       Results for orders placed or performed in visit on 10/25/24   AMB POC HEMOGLOBIN A1C   Result Value Ref Range    Hemoglobin A1C, POC 12.3 %   AMB POC GLUCOSE BLOOD, BY GLUCOSE MONITORING DEVICE   Result Value Ref Range    Glucose,  MG/DL   AMB POC URINALYSIS DIP STICK AUTO W/O MICRO   Result Value Ref Range    Color, Urine, POC Yellow     Clarity, Urine, POC Clear     Glucose, Urine, POC 1000     Bilirubin, Urine, POC Negative     Ketones, Urine, POC Negative     Specific Gravity, Urine, POC 1.015 1.001 - 1.035    Blood, Urine, POC Negative Negative    pH, Urine, POC 6.5 4.6 - 8.0    Protein, Urine, POC 30     Urobilinogen, POC 0.2 mg/dL     Nitrite, Urine, POC Negative     Leukocyte Esterase, Urine, POC Trace            Lab Results   Component Value Date    LABA1C 13.6 (H) 09/11/2023    LABA1C 12.8 (H) 03/01/2023     Lab Results   Component Value Date    MALBCR 1141 (H) 03/01/2023    CREATININE 0.71 09/11/2023           Assessment/Plan:     1. Type 2 diabetes mellitus with hyperglycemia, with long-term current use of insulin (HCC)    2. Microalbuminuria    3. Mixed hyperlipidemia    4. Essential hypertension    5. Yeast infection    6. Adjustment disorder, unspecified type          Orders Placed This Encounter   Procedures    AMB POC HEMOGLOBIN A1C    AMB POC GLUCOSE BLOOD, BY GLUCOSE MONITORING DEVICE    AMB POC URINALYSIS DIP STICK AUTO W/O MICRO     1.Type 2 DM   -uncontrolled as above   -discussed importance of optimum glycemic control   Results for orders placed or performed in visit on 10/25/24   AMB POC HEMOGLOBIN A1C   Result Value Ref Range    Hemoglobin A1C, POC 12.3 %   AMB POC GLUCOSE

## 2025-03-03 RX ORDER — METFORMIN HYDROCHLORIDE 500 MG/1
1000 TABLET, EXTENDED RELEASE ORAL 2 TIMES DAILY
Qty: 60 TABLET | Refills: 0 | Status: SHIPPED | OUTPATIENT
Start: 2025-03-03

## 2025-04-07 DIAGNOSIS — Z79.4 TYPE 2 DIABETES MELLITUS WITH HYPERGLYCEMIA, WITH LONG-TERM CURRENT USE OF INSULIN (HCC): Primary | ICD-10-CM

## 2025-04-07 DIAGNOSIS — E11.65 TYPE 2 DIABETES MELLITUS WITH HYPERGLYCEMIA, WITH LONG-TERM CURRENT USE OF INSULIN (HCC): Primary | ICD-10-CM

## 2025-04-08 RX ORDER — TIRZEPATIDE 7.5 MG/.5ML
INJECTION, SOLUTION SUBCUTANEOUS
Qty: 4 ML | Refills: 0 | OUTPATIENT
Start: 2025-04-08

## 2025-04-30 DIAGNOSIS — I10 ESSENTIAL HYPERTENSION, BENIGN: ICD-10-CM

## 2025-04-30 RX ORDER — LOSARTAN POTASSIUM 100 MG/1
100 TABLET ORAL DAILY
Qty: 30 TABLET | Refills: 0 | Status: SHIPPED | OUTPATIENT
Start: 2025-04-30

## 2025-06-30 NOTE — TELEPHONE ENCOUNTER
Called the patient to get her scheduled for a follow-up and both phone #'s are invalid and the main number just says sorry your call cannot be completed at this time---unable to leave a message-no mychart.

## 2025-07-01 RX ORDER — METFORMIN HYDROCHLORIDE 500 MG/1
TABLET, EXTENDED RELEASE ORAL
Qty: 60 TABLET | Refills: 0 | Status: SHIPPED | OUTPATIENT
Start: 2025-07-01

## 2025-07-01 NOTE — TELEPHONE ENCOUNTER
Called the patient to get her scheduled for a follow-up and both phone #'s are invalid and the main number just says sorry your call cannot be completed at this time---unable to leave a message-no Vicarioushart x 2.

## 2025-07-31 LAB
ALBUMIN SERPL-MCNC: 4.1 G/DL (ref 3.9–4.9)
ALP SERPL-CCNC: 143 IU/L (ref 44–121)
ALT SERPL-CCNC: 12 IU/L (ref 0–32)
AST SERPL-CCNC: 10 IU/L (ref 0–40)
BILIRUB SERPL-MCNC: 0.3 MG/DL (ref 0–1.2)
BUN SERPL-MCNC: 10 MG/DL (ref 6–24)
BUN/CREAT SERPL: 14 (ref 9–23)
CALCIUM SERPL-MCNC: 9.5 MG/DL (ref 8.7–10.2)
CHLORIDE SERPL-SCNC: 97 MMOL/L (ref 96–106)
CHOLEST SERPL-MCNC: 170 MG/DL (ref 100–199)
CO2 SERPL-SCNC: 23 MMOL/L (ref 20–29)
CREAT SERPL-MCNC: 0.7 MG/DL (ref 0.57–1)
EGFRCR SERPLBLD CKD-EPI 2021: 107 ML/MIN/1.73
GLOBULIN SER CALC-MCNC: 3.4 G/DL (ref 1.5–4.5)
GLUCOSE SERPL-MCNC: 383 MG/DL (ref 70–99)
HDLC SERPL-MCNC: 51 MG/DL
LDLC SERPL CALC-MCNC: 82 MG/DL (ref 0–99)
POTASSIUM SERPL-SCNC: 4 MMOL/L (ref 3.5–5.2)
PROT SERPL-MCNC: 7.5 G/DL (ref 6–8.5)
SODIUM SERPL-SCNC: 135 MMOL/L (ref 134–144)
TRIGL SERPL-MCNC: 221 MG/DL (ref 0–149)
TSH SERPL DL<=0.005 MIU/L-ACNC: 2.02 UIU/ML (ref 0.45–4.5)
VLDLC SERPL CALC-MCNC: 37 MG/DL (ref 5–40)

## 2025-08-01 ENCOUNTER — OFFICE VISIT (OUTPATIENT)
Facility: CLINIC | Age: 50
End: 2025-08-01
Payer: MEDICARE

## 2025-08-01 VITALS
TEMPERATURE: 98.6 F | RESPIRATION RATE: 16 BRPM | HEART RATE: 130 BPM | WEIGHT: 293 LBS | OXYGEN SATURATION: 98 % | SYSTOLIC BLOOD PRESSURE: 146 MMHG | HEIGHT: 69 IN | DIASTOLIC BLOOD PRESSURE: 80 MMHG | BODY MASS INDEX: 43.4 KG/M2

## 2025-08-01 DIAGNOSIS — S70.11XA CONTUSION OF RIGHT THIGH, INITIAL ENCOUNTER: ICD-10-CM
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
## 2025-08-01 DIAGNOSIS — E66.813 OBESITY, CLASS 3 (HCC): ICD-10-CM

## 2025-08-01 DIAGNOSIS — I10 ESSENTIAL HYPERTENSION, BENIGN: ICD-10-CM

## 2025-08-01 DIAGNOSIS — E11.65 UNCONTROLLED TYPE 2 DIABETES MELLITUS WITH HYPERGLYCEMIA (HCC): ICD-10-CM

## 2025-08-01 PROBLEM — C54.1 ENDOMETRIAL CANCER (HCC): Status: RESOLVED | Noted: 2023-01-07 | Resolved: 2025-08-01

## 2025-08-01 LAB — HBA1C MFR BLD: 15 %

## 2025-08-01 PROCEDURE — 3079F DIAST BP 80-89 MM HG: CPT | Performed by: INTERNAL MEDICINE

## 2025-08-01 PROCEDURE — 1036F TOBACCO NON-USER: CPT | Performed by: INTERNAL MEDICINE

## 2025-08-01 PROCEDURE — 99215 OFFICE O/P EST HI 40 MIN: CPT | Performed by: INTERNAL MEDICINE

## 2025-08-01 PROCEDURE — 2022F DILAT RTA XM EVC RTNOPTHY: CPT | Performed by: INTERNAL MEDICINE

## 2025-08-01 PROCEDURE — 83036 HEMOGLOBIN GLYCOSYLATED A1C: CPT | Performed by: INTERNAL MEDICINE

## 2025-08-01 PROCEDURE — 3077F SYST BP >= 140 MM HG: CPT | Performed by: INTERNAL MEDICINE

## 2025-08-01 PROCEDURE — G8417 CALC BMI ABV UP PARAM F/U: HCPCS | Performed by: INTERNAL MEDICINE

## 2025-08-01 PROCEDURE — G8427 DOCREV CUR MEDS BY ELIG CLIN: HCPCS | Performed by: INTERNAL MEDICINE

## 2025-08-01 PROCEDURE — 3046F HEMOGLOBIN A1C LEVEL >9.0%: CPT | Performed by: INTERNAL MEDICINE

## 2025-08-01 RX ORDER — TIRZEPATIDE 2.5 MG/.5ML
2.5 INJECTION, SOLUTION SUBCUTANEOUS
Qty: 2 ML | Refills: 0 | Status: SHIPPED | OUTPATIENT
Start: 2025-08-01

## 2025-08-01 RX ORDER — INSULIN GLARGINE 100 [IU]/ML
INJECTION, SOLUTION SUBCUTANEOUS
Qty: 5 ADJUSTABLE DOSE PRE-FILLED PEN SYRINGE | Refills: 0 | Status: SHIPPED | OUTPATIENT
Start: 2025-08-01

## 2025-08-01 RX ORDER — METFORMIN HYDROCHLORIDE 500 MG/1
1000 TABLET, EXTENDED RELEASE ORAL 2 TIMES DAILY
Qty: 120 TABLET | Refills: 0 | Status: SHIPPED | OUTPATIENT
Start: 2025-08-01

## 2025-08-01 RX ORDER — TRIAMCINOLONE ACETONIDE 1 MG/G
OINTMENT TOPICAL 2 TIMES DAILY
COMMUNITY
End: 2025-08-01 | Stop reason: SDUPTHER

## 2025-08-01 RX ORDER — TRIAMCINOLONE ACETONIDE 1 MG/G
OINTMENT TOPICAL 2 TIMES DAILY
Qty: 80 G | Refills: 0 | Status: SHIPPED | OUTPATIENT
Start: 2025-08-01

## 2025-08-01 RX ORDER — METOPROLOL SUCCINATE 50 MG/1
50 TABLET, EXTENDED RELEASE ORAL DAILY
Qty: 90 TABLET | Refills: 0 | Status: SHIPPED | OUTPATIENT
Start: 2025-08-01

## 2025-08-01 SDOH — ECONOMIC STABILITY: FOOD INSECURITY: WITHIN THE PAST 12 MONTHS, YOU WORRIED THAT YOUR FOOD WOULD RUN OUT BEFORE YOU GOT MONEY TO BUY MORE.: NEVER TRUE

## 2025-08-01 SDOH — ECONOMIC STABILITY: FOOD INSECURITY: WITHIN THE PAST 12 MONTHS, THE FOOD YOU BOUGHT JUST DIDN'T LAST AND YOU DIDN'T HAVE MONEY TO GET MORE.: NEVER TRUE

## 2025-08-01 ASSESSMENT — ENCOUNTER SYMPTOMS
ABDOMINAL PAIN: 0
VOMITING: 0
DIARRHEA: 0
NAUSEA: 0
SHORTNESS OF BREATH: 0
COUGH: 0

## 2025-08-01 ASSESSMENT — PATIENT HEALTH QUESTIONNAIRE - PHQ9
2. FEELING DOWN, DEPRESSED OR HOPELESS: NOT AT ALL
SUM OF ALL RESPONSES TO PHQ QUESTIONS 1-9: 0
1. LITTLE INTEREST OR PLEASURE IN DOING THINGS: NOT AT ALL
SUM OF ALL RESPONSES TO PHQ QUESTIONS 1-9: 0

## 2025-08-01 NOTE — PROGRESS NOTES
Have you been to the ER, urgent care clinic since your last visit?  Hospitalized since your last visit?   YES - When: approximately 2 weeks ago.  Where and Why: [facility] for fall.    Have you seen or consulted any other health care providers outside our system since your last visit?   NO      “Have you had a colorectal cancer screening such as a colonoscopy/FIT/Cologuard?    NO    No colonoscopy on file  No cologuard on file  No FIT/FOBT on file   No flexible sigmoidoscopy on file     “Have you had a diabetic eye exam?”    NO     Date of last diabetic eye exam: 1/24/2019     Chief Complaint   Patient presents with    Follow-up     BP (!) 141/82 (BP Site: Right Lower Arm, Patient Position: Sitting, BP Cuff Size: Medium Adult)   Pulse (!) 126   Temp 98.6 °F (37 °C) (Oral)   Resp 16   Ht 1.753 m (5' 9\")   Wt (!) 158.3 kg (349 lb)   SpO2 98%   BMI 51.54 kg/m²

## 2025-08-01 NOTE — PROGRESS NOTES
[facility]  [address], [place] [address]  Phone: [phone]      Indigo Good (: 1975) is a 49 y.o. female, established patient, here for evaluation of the following chief complaint(s):  Follow-up (ER follow up at [facility]), Medication Refill, Diabetes, and Discuss Labs         SUBJECTIVE/OBJECTIVE:  History of Present Illness  The patient is a 49-year-old female with a past medical history of hypertension, hyperlipidemia, type 2 diabetes, history of pulmonary embolism, history of endometrial cancer, and history of lumbar radiculopathy. She is seen today for follow-up on blood pressure, diabetes,ER visit, and review of the recent blood test.    She has been monitoring her blood sugar levels at home, which have been consistently around 300. Her last consultation was in 2024 with diabetes doctor.. She is currently on metformin twice daily and Semglee insulin 36 units. She also takes NovoLog after dinner, typically around 36 to 40 units. She was previously prescribed Mounjaro by diabetes doctor  and reports that her condition improved while on this medication. She continues to experience weight loss.    She is on losartan 100 mg once daily for her blood pressure. She has not taken metoprolol before.    She recently experienced a fall at home, resulting in a visit to the ER where she was diagnosed with a contusion. She had significant swelling in her right thigh, which persists. She also reports a knot in her leg that developed during the fall. An x-ray was performed, but she does not recall the results. She is currently on blood thinners twice daily.    She has been experiencing new pain in her back, specifically on the left side, which she describes as a burning, stabbing sensation. She also reports numbness and itching in the same area. Additionally, she has been experiencing numbness and tingling in her legs and feet.    She is requesting

## 2025-08-08 ENCOUNTER — HOSPITAL ENCOUNTER (OUTPATIENT)
Facility: HOSPITAL | Age: 50
Discharge: HOME | End: 2025-08-10
Attending: INTERNAL MEDICINE
Payer: MEDICARE

## 2025-08-08 DIAGNOSIS — S70.11XA CONTUSION OF RIGHT THIGH, INITIAL ENCOUNTER: ICD-10-CM

## 2025-08-08 PROCEDURE — 93971 EXTREMITY STUDY: CPT

## 2025-08-26 ENCOUNTER — OFFICE VISIT (OUTPATIENT)
Facility: CLINIC | Age: 50
End: 2025-08-26
Payer: MEDICARE

## 2025-08-26 VITALS
WEIGHT: 293 LBS | SYSTOLIC BLOOD PRESSURE: 125 MMHG | RESPIRATION RATE: 18 BRPM | DIASTOLIC BLOOD PRESSURE: 70 MMHG | HEIGHT: 69 IN | TEMPERATURE: 98.4 F | BODY MASS INDEX: 43.4 KG/M2 | HEART RATE: 102 BPM | OXYGEN SATURATION: 98 %

## 2025-08-26 DIAGNOSIS — I10 ESSENTIAL HYPERTENSION, BENIGN: ICD-10-CM

## 2025-08-26 DIAGNOSIS — Z85.42 HISTORY OF ENDOMETRIAL CANCER: ICD-10-CM

## 2025-08-26 DIAGNOSIS — E11.65 UNCONTROLLED TYPE 2 DIABETES MELLITUS WITH HYPERGLYCEMIA (HCC): ICD-10-CM

## 2025-08-26 DIAGNOSIS — R59.0 INGUINAL LYMPHADENOPATHY: Primary | ICD-10-CM

## 2025-08-26 PROCEDURE — 99214 OFFICE O/P EST MOD 30 MIN: CPT | Performed by: INTERNAL MEDICINE

## 2025-08-26 PROCEDURE — 3046F HEMOGLOBIN A1C LEVEL >9.0%: CPT | Performed by: INTERNAL MEDICINE

## 2025-08-26 PROCEDURE — G8417 CALC BMI ABV UP PARAM F/U: HCPCS | Performed by: INTERNAL MEDICINE

## 2025-08-26 PROCEDURE — 2022F DILAT RTA XM EVC RTNOPTHY: CPT | Performed by: INTERNAL MEDICINE

## 2025-08-26 PROCEDURE — 3078F DIAST BP <80 MM HG: CPT | Performed by: INTERNAL MEDICINE

## 2025-08-26 PROCEDURE — 1036F TOBACCO NON-USER: CPT | Performed by: INTERNAL MEDICINE

## 2025-08-26 PROCEDURE — 3074F SYST BP LT 130 MM HG: CPT | Performed by: INTERNAL MEDICINE

## 2025-08-26 PROCEDURE — G8427 DOCREV CUR MEDS BY ELIG CLIN: HCPCS | Performed by: INTERNAL MEDICINE

## 2025-08-26 RX ORDER — CYCLOBENZAPRINE HCL 10 MG
10 TABLET ORAL DAILY PRN
Qty: 30 TABLET | Refills: 3 | Status: SHIPPED | OUTPATIENT
Start: 2025-08-26

## 2025-08-26 RX ORDER — LOSARTAN POTASSIUM 100 MG/1
100 TABLET ORAL DAILY
Qty: 90 TABLET | Refills: 1 | Status: SHIPPED | OUTPATIENT
Start: 2025-08-26

## 2025-08-26 ASSESSMENT — ENCOUNTER SYMPTOMS
NAUSEA: 0
COUGH: 0
SHORTNESS OF BREATH: 0
VOMITING: 0
ABDOMINAL PAIN: 0
DIARRHEA: 0